# Patient Record
Sex: MALE | Race: OTHER | HISPANIC OR LATINO | Employment: FULL TIME | URBAN - METROPOLITAN AREA
[De-identification: names, ages, dates, MRNs, and addresses within clinical notes are randomized per-mention and may not be internally consistent; named-entity substitution may affect disease eponyms.]

---

## 2019-02-27 ENCOUNTER — APPOINTMENT (OUTPATIENT)
Dept: RADIOLOGY | Age: 29
End: 2019-02-27
Attending: PREVENTIVE MEDICINE
Payer: OTHER MISCELLANEOUS

## 2019-02-27 ENCOUNTER — APPOINTMENT (OUTPATIENT)
Dept: URGENT CARE | Age: 29
End: 2019-02-27
Payer: OTHER MISCELLANEOUS

## 2019-02-27 DIAGNOSIS — S69.90XA INJURY OF WRIST, UNSPECIFIED LATERALITY, INITIAL ENCOUNTER: ICD-10-CM

## 2019-02-27 DIAGNOSIS — S69.90XA INJURY OF WRIST, UNSPECIFIED LATERALITY, INITIAL ENCOUNTER: Primary | ICD-10-CM

## 2019-02-27 PROCEDURE — 99283 EMERGENCY DEPT VISIT LOW MDM: CPT | Performed by: PREVENTIVE MEDICINE

## 2019-02-27 PROCEDURE — G0382 LEV 3 HOSP TYPE B ED VISIT: HCPCS | Performed by: PREVENTIVE MEDICINE

## 2019-02-27 PROCEDURE — 73130 X-RAY EXAM OF HAND: CPT

## 2019-02-27 PROCEDURE — 73110 X-RAY EXAM OF WRIST: CPT

## 2019-03-06 ENCOUNTER — APPOINTMENT (OUTPATIENT)
Dept: URGENT CARE | Age: 29
End: 2019-03-06
Payer: OTHER MISCELLANEOUS

## 2019-03-06 ENCOUNTER — TRANSCRIBE ORDERS (OUTPATIENT)
Dept: ADMINISTRATIVE | Facility: HOSPITAL | Age: 29
End: 2019-03-06

## 2019-03-06 DIAGNOSIS — G56.00 CARPAL TUNNEL SYNDROME, UNSPECIFIED LATERALITY: Primary | ICD-10-CM

## 2019-03-06 DIAGNOSIS — R60.0 LOCALIZED EDEMA: ICD-10-CM

## 2019-03-06 PROCEDURE — 99213 OFFICE O/P EST LOW 20 MIN: CPT | Performed by: PREVENTIVE MEDICINE

## 2019-03-20 ENCOUNTER — APPOINTMENT (OUTPATIENT)
Dept: URGENT CARE | Age: 29
End: 2019-03-20
Payer: OTHER MISCELLANEOUS

## 2019-03-20 PROCEDURE — 99213 OFFICE O/P EST LOW 20 MIN: CPT | Performed by: PREVENTIVE MEDICINE

## 2019-05-17 ENCOUNTER — APPOINTMENT (OUTPATIENT)
Dept: URGENT CARE | Age: 29
End: 2019-05-17

## 2019-07-10 VITALS
WEIGHT: 246.2 LBS | SYSTOLIC BLOOD PRESSURE: 116 MMHG | HEIGHT: 69 IN | DIASTOLIC BLOOD PRESSURE: 71 MMHG | BODY MASS INDEX: 36.46 KG/M2 | HEART RATE: 70 BPM

## 2019-07-10 DIAGNOSIS — G56.01 CARPAL TUNNEL SYNDROME ON RIGHT: Primary | ICD-10-CM

## 2019-07-10 DIAGNOSIS — G56.02 CARPAL TUNNEL SYNDROME OF LEFT WRIST: ICD-10-CM

## 2019-07-10 PROCEDURE — 99203 OFFICE O/P NEW LOW 30 MIN: CPT | Performed by: ORTHOPAEDIC SURGERY

## 2019-07-10 NOTE — H&P
ASSESSMENT/PLAN:    Assessment:   Carpal Tunnel Syndrome  bilateral    Plan:   Endoscopic Carpal Tunnel Release  Left  Endoscopic Carpal Tunnel Release  Right    Follow Up: After Surgery    To Do Next Visit:  Sutures out    Operative Discussions:     Endoscopic Carpal Tunnel Release: The anatomy and physiology of carpal tunnel syndrome was discussed with the patient today  Increase pressure localized under the transverse carpal ligament can cause pain, numbness, tingling, or dysesthesias within the median nerve distribution as well as feelings of fatigue, clumsiness, or awkwardness  These symptoms typically occur at night and worse in the morning upon waking  Eventually, untreated carpal tunnel syndrome can result in weakness and permanent loss of muscle within the thenar compartment of the hand  Treatment options were discussed with the patient  Conservative treatment includes nocturnal resting splints to keep the nerve in a neutral position, ergonomic changes within the work or home environment, activity modification, and tendon gliding exercises  Steroid injections within the carpal canal can help a majority of patients, however this is often self-limited in a majority of patients  Surgical intervention to divide the transverse carpal ligament typically results in a long-lasting relief of the patient's complaints, with the recurrence rate of less than 1%  The patient has elected to undergo an endoscopic carpal tunnel release  The 2 incision technique was discussed with the patient, which results in approximately a two-week less recovery time, and less wound complications  In the postoperative period, light activities are allowed immediately, driving is allowed when narcotic medication has stopped, and the bandages may be removed and incision may get wet after 2 days  Heavy activities (lifting more than approximately 10 pounds) will be allowed after follow up appointment in 1-2 weeks    While the pain and discomfort in the hands generally improves rapidly, the numbness and tingling as well as the strength will slowly improve over weeks to months depending on the severity of the carpal tunnel syndrome  Pillar pain was discussed with the patient, which is typically a common but self-limiting condition  The risks of bleeding and infection from the surgery are less than 1%  Risk of recurrence is approximately 0 5%  The risks of nerve injury or nerve damage or damage to the blood vessels is approximately 1 in 1200  The patient has an understanding of the above mentioned discussion  The risks and benefits of the procedure were explained to the patient, which include, but are not limited to: Bleeding, infection, recurrence, pain, scar, damage to tendons, damage to nerves, and damage to blood vessels, failure to give desired results and complications related to anesthesia   These risks, along with alternative conservative treatment options, and postoperative protocols were voiced back and understood by the patient   All questions were answered to the patient's satisfaction   The patient agrees to comply with a standard postoperative protocol, and is willing to proceed   Education was provided via written and auditory forms   There were no barriers to learning  Written handouts regarding wound care, incision and scar care, and general preoperative information was provided to the patient   Prior to surgery, the patient may be requested to stop all anti-inflammatory medications   Prophylactic aspirin, Plavix, and Coumadin may be allowed to be continued   Medications including vitamin E , ginkgo, and fish oil are requested to be stopped approximately one week prior to surgery   Hypertensive medications and beta blockers, if taken, should be continued        _____________________________________________________  CHIEF COMPLAINT:  Chief Complaint   Patient presents with    Left Wrist - Pain, Numbness, Tingling, Swelling    Right Wrist - Pain, Numbness, Tingling, Swelling         SUBJECTIVE:  Michelle Maria is a 30y o  year old male who presents with Numbness to the bilateral index finger, long finger and thumb  This started  1 year(s) ago as Due to a work injury  Patient states he has been wearing his night time splints as instructed with minimal relief of his symptoms  Symptoms wake him from sleep at night  He has increased pain when gripping things at work  PAST MEDICAL HISTORY:  History reviewed  No pertinent past medical history  PAST SURGICAL HISTORY:  Past Surgical History:   Procedure Laterality Date    APPENDECTOMY         FAMILY HISTORY:  Family History   Problem Relation Age of Onset    No Known Problems Mother     No Known Problems Father        SOCIAL HISTORY:  Social History     Tobacco Use    Smoking status: Never Smoker    Smokeless tobacco: Never Used   Substance Use Topics    Alcohol use: Not on file    Drug use: Not on file       MEDICATIONS:  No current outpatient medications on file  ALLERGIES:  No Known Allergies    REVIEW OF SYSTEMS:  Pertinent items are noted in HPI  A comprehensive review of systems was negative  LABS:  HgA1c: No results found for: HGBA1C  BMP: No results found for: GLUCOSE, CALCIUM, NA, K, CO2, CL, BUN, CREATININE      _____________________________________________________  PHYSICAL EXAMINATION:  /71   Pulse 70   Ht 5' 9" (1 753 m)   Wt 112 kg (246 lb 3 2 oz)   BMI 36 36 kg/m²    General: well developed and well nourished, alert, oriented times 3 and appears comfortable  Psychiatric: Normal  HEENT: Trachea Midline, No torticollis  Cardiovascular: No discernable arrhythmia  Pulmonary: No wheezing or stridor  Skin: No masses, erthema, lacerations, fluctation, ulcerations  Neurovascular: Pulses Intact    MUSCULOSKELETAL EXAMINATION:  Right Carpal Tunnel Exam:  Negataive thenar atrophy  Positive phalen's test  Positive carpal tunnel compression   Positive tinels over median nerve at the wrist   APB 4+5   +TTP over the [facility] joint, no crepitation  Positive scratch collapse    Left Carpal Tunnel Exam:  Negative thenar atrophy  Negative phalen's test  Positive carpal tunnel compression   Positive tinels over median nerve at the wrist   APB 5/5  +TTP over the [facility] joint, no crepitiation  Positive scratch collapse    _____________________________________________________  STUDIES REVIEWED:  EMG: done outside at [facility] shows moderate carpal tunnel syndrome bilaterally       PROCEDURES PERFORMED:  Procedures  No Procedures performed today      Scribe Attestation    I,:   Glenny Pearce am acting as a scribe while in the presence of the attending physician :        I,:   Alicia Pedro MD personally performed the services described in this documentation    as scribed in my presence :          Scribe Attestation    I,:   Glenny Pearce am acting as a scribe while in the presence of the attending physician :        I,:   Alicia Pedro MD personally performed the services described in this documentation    as scribed in my presence :

## 2019-07-10 NOTE — LETTER
July 10, 2019     Patient: Lopez Araujo   YOB: 1990   Date of Visit: 7/10/2019       To Whom it May Concern:    Lopez Aarujo is under my professional care  He was seen in my office on 7/10/2019  He will remain out of work from his surgery date to post op date  Patient will likely be released to light duty for 3 weeks following is surgery with anticipation full duty release 6 weeks following his second surgery  If you have any questions or concerns, please don't hesitate to call           Sincerely,          Corona Tam MD        CC: No Recipients

## 2019-07-10 NOTE — PROGRESS NOTES
ASSESSMENT/PLAN:    Assessment:   Carpal Tunnel Syndrome  bilateral    Plan:   Endoscopic Carpal Tunnel Release  Left  Endoscopic Carpal Tunnel Release  Right    Follow Up: After Surgery    To Do Next Visit:  Sutures out    Operative Discussions:     Endoscopic Carpal Tunnel Release: The anatomy and physiology of carpal tunnel syndrome was discussed with the patient today  Increase pressure localized under the transverse carpal ligament can cause pain, numbness, tingling, or dysesthesias within the median nerve distribution as well as feelings of fatigue, clumsiness, or awkwardness  These symptoms typically occur at night and worse in the morning upon waking  Eventually, untreated carpal tunnel syndrome can result in weakness and permanent loss of muscle within the thenar compartment of the hand  Treatment options were discussed with the patient  Conservative treatment includes nocturnal resting splints to keep the nerve in a neutral position, ergonomic changes within the work or home environment, activity modification, and tendon gliding exercises  Steroid injections within the carpal canal can help a majority of patients, however this is often self-limited in a majority of patients  Surgical intervention to divide the transverse carpal ligament typically results in a long-lasting relief of the patient's complaints, with the recurrence rate of less than 1%  The patient has elected to undergo an endoscopic carpal tunnel release  The 2 incision technique was discussed with the patient, which results in approximately a two-week less recovery time, and less wound complications  In the postoperative period, light activities are allowed immediately, driving is allowed when narcotic medication has stopped, and the bandages may be removed and incision may get wet after 2 days  Heavy activities (lifting more than approximately 10 pounds) will be allowed after follow up appointment in 1-2 weeks    While the pain and discomfort in the hands generally improves rapidly, the numbness and tingling as well as the strength will slowly improve over weeks to months depending on the severity of the carpal tunnel syndrome  Pillar pain was discussed with the patient, which is typically a common but self-limiting condition  The risks of bleeding and infection from the surgery are less than 1%  Risk of recurrence is approximately 0 5%  The risks of nerve injury or nerve damage or damage to the blood vessels is approximately 1 in 1200  The patient has an understanding of the above mentioned discussion  The risks and benefits of the procedure were explained to the patient, which include, but are not limited to: Bleeding, infection, recurrence, pain, scar, damage to tendons, damage to nerves, and damage to blood vessels, failure to give desired results and complications related to anesthesia   These risks, along with alternative conservative treatment options, and postoperative protocols were voiced back and understood by the patient   All questions were answered to the patient's satisfaction   The patient agrees to comply with a standard postoperative protocol, and is willing to proceed   Education was provided via written and auditory forms   There were no barriers to learning  Written handouts regarding wound care, incision and scar care, and general preoperative information was provided to the patient   Prior to surgery, the patient may be requested to stop all anti-inflammatory medications   Prophylactic aspirin, Plavix, and Coumadin may be allowed to be continued   Medications including vitamin E , ginkgo, and fish oil are requested to be stopped approximately one week prior to surgery   Hypertensive medications and beta blockers, if taken, should be continued        _____________________________________________________  CHIEF COMPLAINT:  Chief Complaint   Patient presents with    Left Wrist - Pain, Numbness, Tingling, Swelling    Right Wrist - Pain, Numbness, Tingling, Swelling         SUBJECTIVE:  Tone Khan is a 30y o  year old male who presents with Numbness to the bilateral index finger, long finger and thumb  This started  1 year(s) ago as Due to a work injury  Patient states he has been wearing his night time splints as instructed with minimal relief of his symptoms  Symptoms wake him from sleep at night  He has increased pain when gripping things at work  PAST MEDICAL HISTORY:  History reviewed  No pertinent past medical history  PAST SURGICAL HISTORY:  Past Surgical History:   Procedure Laterality Date    APPENDECTOMY         FAMILY HISTORY:  Family History   Problem Relation Age of Onset    No Known Problems Mother     No Known Problems Father        SOCIAL HISTORY:  Social History     Tobacco Use    Smoking status: Never Smoker    Smokeless tobacco: Never Used   Substance Use Topics    Alcohol use: Not on file    Drug use: Not on file       MEDICATIONS:  No current outpatient medications on file  ALLERGIES:  No Known Allergies    REVIEW OF SYSTEMS:  Pertinent items are noted in HPI  A comprehensive review of systems was negative  LABS:  HgA1c: No results found for: HGBA1C  BMP: No results found for: GLUCOSE, CALCIUM, NA, K, CO2, CL, BUN, CREATININE      _____________________________________________________  PHYSICAL EXAMINATION:  /71   Pulse 70   Ht 5' 9" (1 753 m)   Wt 112 kg (246 lb 3 2 oz)   BMI 36 36 kg/m²   General: well developed and well nourished, alert, oriented times 3 and appears comfortable  Psychiatric: Normal  HEENT: Trachea Midline, No torticollis  Cardiovascular: No discernable arrhythmia  Pulmonary: No wheezing or stridor  Skin: No masses, erthema, lacerations, fluctation, ulcerations  Neurovascular: Pulses Intact    MUSCULOSKELETAL EXAMINATION:  Right Carpal Tunnel Exam:  Negataive thenar atrophy  Positive phalen's test  Positive carpal tunnel compression   Positive tinels over median nerve at the wrist   APB 4+5   +TTP over the [facility] joint, no crepitation  Positive scratch collapse    Left Carpal Tunnel Exam:  Negative thenar atrophy  Negative phalen's test  Positive carpal tunnel compression   Positive tinels over median nerve at the wrist   APB 5/5  +TTP over the [facility] joint, no crepitiation  Positive scratch collapse    _____________________________________________________  STUDIES REVIEWED:  EMG: done outside at [facility]  shows moderate carpal tunnel syndrome bilaterally       PROCEDURES PERFORMED:  Procedures  No Procedures performed today      Scribe Attestation    I,:   Arleen Witt am acting as a scribe while in the presence of the attending physician :        I,:   Job Guillermo MD personally performed the services described in this documentation    as scribed in my presence :          Scribe Attestation    I,:   Arleen Witt am acting as a scribe while in the presence of the attending physician :        I,:   Job Guillermo MD personally performed the services described in this documentation    as scribed in my presence :

## 2019-08-08 ENCOUNTER — OFFICE VISIT (OUTPATIENT)
Dept: FAMILY MEDICINE CLINIC | Facility: CLINIC | Age: 29
End: 2019-08-08
Payer: COMMERCIAL

## 2019-08-08 VITALS
WEIGHT: 241 LBS | TEMPERATURE: 97.5 F | SYSTOLIC BLOOD PRESSURE: 102 MMHG | HEART RATE: 82 BPM | HEIGHT: 69 IN | BODY MASS INDEX: 35.7 KG/M2 | DIASTOLIC BLOOD PRESSURE: 70 MMHG | OXYGEN SATURATION: 97 % | RESPIRATION RATE: 17 BRPM

## 2019-08-08 DIAGNOSIS — Z13.1 ENCOUNTER FOR SCREENING EXAMINATION FOR IMPAIRED GLUCOSE REGULATION AND DIABETES MELLITUS: ICD-10-CM

## 2019-08-08 DIAGNOSIS — N39.43 DRIBBLING FOLLOWING URINATION: ICD-10-CM

## 2019-08-08 DIAGNOSIS — R39.11 URINARY HESITANCY: ICD-10-CM

## 2019-08-08 DIAGNOSIS — E78.6 LOW HDL (UNDER 40): ICD-10-CM

## 2019-08-08 DIAGNOSIS — Z76.89 ENCOUNTER TO ESTABLISH CARE: Primary | ICD-10-CM

## 2019-08-08 PROBLEM — Z87.09 HISTORY OF ASTHMA: Status: ACTIVE | Noted: 2019-08-08

## 2019-08-08 PROCEDURE — 99203 OFFICE O/P NEW LOW 30 MIN: CPT | Performed by: FAMILY MEDICINE

## 2019-08-08 PROCEDURE — 3008F BODY MASS INDEX DOCD: CPT | Performed by: FAMILY MEDICINE

## 2019-08-08 RX ORDER — CHLORAL HYDRATE 500 MG
1000 CAPSULE ORAL DAILY
COMMUNITY
End: 2021-04-16

## 2019-08-08 NOTE — PROGRESS NOTES
Assessment/Plan:         Diagnoses and all orders for this visit:    Encounter to establish care    Low HDL (under 40)  -     Lipid panel; Future  -     Comprehensive metabolic panel; Future    Urinary hesitancy  -     UA w Reflex to Microscopic w Reflex to Culture -Lab Collect  -     Ambulatory referral to Urology; Future    Dribbling following urination  -     UA w Reflex to Microscopic w Reflex to Culture -Lab Collect  -     Ambulatory referral to Urology; Future    Encounter for screening examination for impaired glucose regulation and diabetes mellitus  -     Comprehensive metabolic panel; Future          Subjective:   Chief Complaint   Patient presents with   [facility] Establish Care     getting carpal tunnel sx done in 2 weeks through workmans comp    check up     has not been to a doc in years- would like to have routine BW done    Difficulty Urinating     takes a while to relieve himself         Patient ID: Funmi Fritz is a 30 y o  male  New pt, [facility] has been [facility], old labs show only low HDL as abnormality  He does give C/o couple years urinary hesitancy, getting worse, assoc with dribbling following urination, no pain or other urinary c/o        The following portions of the patient's history were reviewed and updated as appropriate: allergies, current medications, past family history, past medical history, past social history, past surgical history and problem list     Review of Systems   Constitutional: Negative  Eyes: Negative  Respiratory: Negative  Cardiovascular: Negative  Gastrointestinal: Negative  Endocrine: Negative  Genitourinary: Negative for decreased urine volume, discharge, dysuria, enuresis, flank pain, frequency, genital sores, hematuria, penile pain, scrotal swelling, testicular pain and urgency  Per hpi   Skin: Negative  Neurological: Negative  Hematological: Negative            Objective:      /70   Pulse 82   Temp 97 5 °F (36 4 °C)   Resp 17   Ht 5' 9" (1 753 m)   Wt 109 kg (241 lb)   SpO2 97%   BMI 35 59 kg/m²          Physical Exam   Constitutional: He is oriented to person, place, and time  He appears well-developed  He is cooperative  Non-toxic appearance  He does not have a sickly appearance  He does not appear ill  No distress  HENT:   Head: Normocephalic and atraumatic  Mouth/Throat: Uvula is midline, oropharynx is clear and moist and mucous membranes are normal    Eyes: Pupils are equal, round, and reactive to light  Conjunctivae and lids are normal    Neck: Trachea normal  Neck supple  No JVD present  No thyroid mass and no thyromegaly present  Cardiovascular: Normal rate, regular rhythm, normal heart sounds and normal pulses  Pulmonary/Chest: Effort normal and breath sounds normal    Abdominal: Soft  Bowel sounds are normal  He exhibits no distension, no abdominal bruit and no mass  There is no hepatosplenomegaly  There is no tenderness  Genitourinary:   Genitourinary Comments: Deferred     Lymphadenopathy:     He has no cervical adenopathy  Right: No supraclavicular adenopathy present  Left: No supraclavicular adenopathy present  Neurological: He is alert and oriented to person, place, and time  He has normal strength and normal reflexes  Gait normal    Skin: Skin is warm and dry  He is not diaphoretic  No cyanosis  No pallor  Psychiatric: He has a normal mood and affect  His behavior is normal    Nursing note and vitals reviewed

## 2019-08-19 ENCOUNTER — ANESTHESIA EVENT (OUTPATIENT)
Dept: PERIOP | Facility: HOSPITAL | Age: 29
End: 2019-08-19
Payer: OTHER MISCELLANEOUS

## 2019-08-20 ENCOUNTER — ANESTHESIA (OUTPATIENT)
Dept: PERIOP | Facility: HOSPITAL | Age: 29
End: 2019-08-20
Payer: OTHER MISCELLANEOUS

## 2019-08-20 ENCOUNTER — HOSPITAL ENCOUNTER (OUTPATIENT)
Facility: HOSPITAL | Age: 29
Setting detail: OUTPATIENT SURGERY
Discharge: HOME | End: 2019-08-20
Attending: ORTHOPAEDIC SURGERY | Admitting: ORTHOPAEDIC SURGERY
Payer: OTHER MISCELLANEOUS

## 2019-08-20 VITALS
BODY MASS INDEX: 35.55 KG/M2 | OXYGEN SATURATION: 97 % | TEMPERATURE: 97.9 F | HEIGHT: 69 IN | RESPIRATION RATE: 20 BRPM | DIASTOLIC BLOOD PRESSURE: 65 MMHG | SYSTOLIC BLOOD PRESSURE: 117 MMHG | HEART RATE: 69 BPM | WEIGHT: 240 LBS

## 2019-08-20 DIAGNOSIS — G56.01 CARPAL TUNNEL SYNDROME ON RIGHT: Primary | ICD-10-CM

## 2019-08-20 DIAGNOSIS — G56.02 CARPAL TUNNEL SYNDROME OF LEFT WRIST: ICD-10-CM

## 2019-08-20 PROCEDURE — 29848 WRIST ENDOSCOPY/SURGERY: CPT | Performed by: ORTHOPAEDIC SURGERY

## 2019-08-20 PROCEDURE — 99024 POSTOP FOLLOW-UP VISIT: CPT | Performed by: ORTHOPAEDIC SURGERY

## 2019-08-20 PROCEDURE — 29848 WRIST ENDOSCOPY/SURGERY: CPT | Performed by: PHYSICIAN ASSISTANT

## 2019-08-20 RX ORDER — HYDROCODONE BITARTRATE AND ACETAMINOPHEN 5; 325 MG/1; MG/1
1 TABLET ORAL EVERY 6 HOURS PRN
Qty: 5 TABLET | Refills: 0 | Status: ON HOLD | OUTPATIENT
Start: 2019-08-20 | End: 2019-10-08 | Stop reason: SDUPTHER

## 2019-08-20 RX ORDER — DEXAMETHASONE SODIUM PHOSPHATE 4 MG/ML
10 INJECTION, SOLUTION INTRA-ARTICULAR; INTRALESIONAL; INTRAMUSCULAR; INTRAVENOUS; SOFT TISSUE ONCE AS NEEDED
Status: DISCONTINUED | OUTPATIENT
Start: 2019-08-20 | End: 2019-08-20 | Stop reason: HOSPADM

## 2019-08-20 RX ORDER — LIDOCAINE HYDROCHLORIDE 10 MG/ML
0.5 INJECTION, SOLUTION EPIDURAL; INFILTRATION; INTRACAUDAL; PERINEURAL ONCE AS NEEDED
Status: DISCONTINUED | OUTPATIENT
Start: 2019-08-20 | End: 2019-08-20 | Stop reason: HOSPADM

## 2019-08-20 RX ORDER — SODIUM CHLORIDE, SODIUM LACTATE, POTASSIUM CHLORIDE, CALCIUM CHLORIDE 600; 310; 30; 20 MG/100ML; MG/100ML; MG/100ML; MG/100ML
125 INJECTION, SOLUTION INTRAVENOUS CONTINUOUS
Status: DISCONTINUED | OUTPATIENT
Start: 2019-08-20 | End: 2019-08-20 | Stop reason: HOSPADM

## 2019-08-20 RX ORDER — LIDOCAINE HYDROCHLORIDE AND EPINEPHRINE 10; 10 MG/ML; UG/ML
INJECTION, SOLUTION INFILTRATION; PERINEURAL AS NEEDED
Status: DISCONTINUED | OUTPATIENT
Start: 2019-08-20 | End: 2019-08-20 | Stop reason: HOSPADM

## 2019-08-20 RX ORDER — KETAMINE HYDROCHLORIDE 50 MG/ML
INJECTION, SOLUTION, CONCENTRATE INTRAMUSCULAR; INTRAVENOUS AS NEEDED
Status: DISCONTINUED | OUTPATIENT
Start: 2019-08-20 | End: 2019-08-20 | Stop reason: SURG

## 2019-08-20 RX ORDER — ONDANSETRON 2 MG/ML
INJECTION INTRAMUSCULAR; INTRAVENOUS AS NEEDED
Status: DISCONTINUED | OUTPATIENT
Start: 2019-08-20 | End: 2019-08-20 | Stop reason: SURG

## 2019-08-20 RX ORDER — NAPROXEN SODIUM 220 MG
220 TABLET ORAL 2 TIMES DAILY WITH MEALS
Qty: 10 TABLET | Refills: 0 | Status: ON HOLD | OUTPATIENT
Start: 2019-08-20 | End: 2019-10-08

## 2019-08-20 RX ORDER — SENNOSIDES 8.6 MG
650 CAPSULE ORAL EVERY 8 HOURS
Qty: 15 TABLET | Refills: 0 | Status: SHIPPED | OUTPATIENT
Start: 2019-08-20 | End: 2019-08-25

## 2019-08-20 RX ORDER — MIDAZOLAM HYDROCHLORIDE 1 MG/ML
INJECTION INTRAMUSCULAR; INTRAVENOUS AS NEEDED
Status: DISCONTINUED | OUTPATIENT
Start: 2019-08-20 | End: 2019-08-20 | Stop reason: SURG

## 2019-08-20 RX ORDER — GLYCOPYRROLATE 0.2 MG/ML
INJECTION INTRAMUSCULAR; INTRAVENOUS AS NEEDED
Status: DISCONTINUED | OUTPATIENT
Start: 2019-08-20 | End: 2019-08-20 | Stop reason: SURG

## 2019-08-20 RX ORDER — PROPOFOL 10 MG/ML
INJECTION, EMULSION INTRAVENOUS CONTINUOUS PRN
Status: DISCONTINUED | OUTPATIENT
Start: 2019-08-20 | End: 2019-08-20 | Stop reason: SURG

## 2019-08-20 RX ORDER — MULTIVITAMIN
1 CAPSULE ORAL DAILY
COMMUNITY
End: 2021-04-16

## 2019-08-20 RX ORDER — HYDROCODONE BITARTRATE AND ACETAMINOPHEN 5; 325 MG/1; MG/1
1 TABLET ORAL ONCE AS NEEDED
Status: DISCONTINUED | OUTPATIENT
Start: 2019-08-20 | End: 2019-08-20 | Stop reason: HOSPADM

## 2019-08-20 RX ORDER — PROPOFOL 10 MG/ML
INJECTION, EMULSION INTRAVENOUS AS NEEDED
Status: DISCONTINUED | OUTPATIENT
Start: 2019-08-20 | End: 2019-08-20 | Stop reason: SURG

## 2019-08-20 RX ADMIN — SODIUM CHLORIDE, SODIUM LACTATE, POTASSIUM CHLORIDE, AND CALCIUM CHLORIDE: .6; .31; .03; .02 INJECTION, SOLUTION INTRAVENOUS at 09:42

## 2019-08-20 RX ADMIN — PROPOFOL 50 MG: 10 INJECTION, EMULSION INTRAVENOUS at 10:03

## 2019-08-20 RX ADMIN — PROPOFOL 20 MCG/KG/MIN: 10 INJECTION, EMULSION INTRAVENOUS at 09:51

## 2019-08-20 RX ADMIN — PROPOFOL 50 MG: 10 INJECTION, EMULSION INTRAVENOUS at 09:55

## 2019-08-20 RX ADMIN — GLYCOPYRROLATE 0.2 MG: 0.2 INJECTION, SOLUTION INTRAMUSCULAR; INTRAVENOUS at 09:55

## 2019-08-20 RX ADMIN — PROPOFOL 50 MG: 10 INJECTION, EMULSION INTRAVENOUS at 10:05

## 2019-08-20 RX ADMIN — PROPOFOL 50 MG: 10 INJECTION, EMULSION INTRAVENOUS at 09:58

## 2019-08-20 RX ADMIN — PROPOFOL 50 MG: 10 INJECTION, EMULSION INTRAVENOUS at 10:01

## 2019-08-20 RX ADMIN — ONDANSETRON 4 MG: 2 INJECTION INTRAMUSCULAR; INTRAVENOUS at 09:55

## 2019-08-20 RX ADMIN — KETAMINE HYDROCHLORIDE 30 MG: 50 INJECTION, SOLUTION INTRAMUSCULAR; INTRAVENOUS at 09:55

## 2019-08-20 RX ADMIN — MIDAZOLAM 2 MG: 1 INJECTION INTRAMUSCULAR; INTRAVENOUS at 09:48

## 2019-08-20 NOTE — ANESTHESIA PREPROCEDURE EVALUATION
Review of Systems/Medical History          Cardiovascular   Pulmonary       GI/Hepatic            Endo/Other    Obesity    GYN       Hematology   Musculoskeletal       Neurology   Psychology           Physical Exam    Airway    Mallampati score: II         Dental       Cardiovascular      Pulmonary      Other Findings        Anesthesia Plan  ASA Score- 2     Anesthesia Type- IV sedation with anesthesia with ASA Monitors  Additional Monitors:   Airway Plan:         Plan Factors-    Induction- intravenous  Postoperative Plan-     Informed Consent- Anesthetic plan and risks discussed with patient  I personally reviewed this patient with the CRNA  Discussed and agreed on the Anesthesia Plan with the CRNA  Levi Ramsey

## 2019-08-20 NOTE — OP NOTE
OPERATIVE REPORT  PATIENT NAME: Karthik Keen  :  1990  MRN: [id number]  Pt Location: [facility]    SURGERY DATE: 19    Surgeon(s) and Role:     * Cinthia Triplett MD - Primary     * Stef Donovan PA-C - Assisting    Pre-Op Diagnosis:  Carpal tunnel syndrome on right [G56 01]    Post-Op Diagnosis Codes:     * Carpal tunnel syndrome on right [G56 01]    Procedure(s):  RELEASE CARPAL TUNNEL ENDOSCOPIC (Right)    Specimen(s):  * No orders in the log *    Estimated Blood Loss:   Minimal      Anesthesia Type:   IV Sedation with Anesthesia    Operative Indications: The patient has a history of Carpal Tunnel Syndrome  right that was recalcitrant to conservative management  The decision was made to bring the patient to the operating room for Endoscopic Carpal Tunnel Release  right  Risks of the procedure were explained which include, but are not limited to bleeding; infection; damage to nerves, arteries,veins, tendons; scar; pain; need for reoperation; failure to give desired result; and risks of anaesthesia  All questions were answered to satisfaction and they were willing to proceed  Operative Findings:  Right carpal tunnel syndrome    Complications:   None    Procedure and Technique:  After the patient, site, and procedure were identified, the patient was brought into the operating room in a supine position  Local anaesthesia and sedation were provided  A tourniquet was not used  The  right upper extremity was then prepped and drapped in a normal, sterile, orthopedic fashion  After reconfirmation of the patient, site, and surgical procedure, which was agreed upon by the entire surgical team, attention was turned to the right wrist   The sites of the proximal and distal incisions were marked    The dev of the proximal incision was placed horizontally at the midline of the wrist   The distal incision dev was longitudinal extending distally from the point of intersection of the line between the long finger and ring finger and the line along the distal border of the fully abducted thumb  The proximal incision was performed  Subcutaneous tissues were dissected  Then the transverse volar antebrachial fascia was perforated with a scalpel  The edges of the skin incision where retracted and the forearm fascia was incised for approximately 1 5 cm proximally with care taken to identify and protect the median nerve  Retractors were used to inspect the transverse carpal ligament distally  A curved Cevallos dissector was used to glide under the transverse carpal ligament and superficial to the median nerve with confirmation via the washboard feeling  Then the curved Cevallos was pushed into the palm toward the distal incision site  When the location of the distal skin dev was adequate, the distal incision was made  Then with retraction of the skin, further dissection and perforation of the palmar fascia was performed with the use of tenotomy scissors  The curved Cevallos was guided from proximal to distal out the distal incisions without any twisting to allow for dilation of the tract  The curved Cevallos was removed, and the cannula for the camera was inserted along the same tract, making sure to keep the alignment post on the cannula perpendicular to the plane of the hand without twisting  Then while keeping the wrist in extension, and holding the cannula of the camera in place, the wrist was placed on the hyperextension board  The scope was inserted distally, and a cotton-tip applicator was used proximally to clean the tract as well as the scope  A curved cutting knife was introduced from proximal to distal while keeping visualization with the use of the camera  Without twisting of the canula, the knife was used to cut the transverse carpal ligament completely, making sure there were no remnant fibers  Then after this was accomplished, the hand was removed from the extension block    Three maneuvers were used to confirm the full release of the transverse carpal ligament  First, the ease of twisting the trocar of the camera confirmed the release of the ligament  Second, the curved Cevallos was introduced to make sure there were no remnant fibers that could be felt palmarly  Third, the scope was introduced again to visualize that the whole ligament was released proximally to distally  Additional confirmation of full release included retraction and inspection in the distal and proximal incisions to make sure there were no remnant fibers distally or proximally respectively  At the completion of the procedure, hemostasis was obtained with cautery and direct pressure  The wounds were copiously irrigated with sterile solution  The wounds were closed with Prolene  Sterile dressings were applied, including Xeroform, gauze, tweeners, webril, ACE  Please note, all sponge, needle, and instrument counts were correct prior to closure  Loupe magnification was utilized  The patient tolerated the procedure well       I was present for the entire procedure, A qualified resident physician was not available and A physician assistant was required during the procedure for retraction tissue handling,dissection and suturing    Patient Disposition:  PACU  and hemodynamically stable    SIGNATURE: Glendale Apley, MD  DATE: 08/20/19  TIME: 10:28 AM

## 2019-08-20 NOTE — H&P
H&P Exam - Orthopedics   Lopez Araujo 30 y o  male MRN: [id number]  Unit/Bed#: OR POOL    Assessment/Plan   Assessment:  B/L CTS  Plan:  R ECTR today followed by L ECTR at a later date    History of Present Illness   HPI:  Lopez Araujo is a 30 y o  y o  male who presents with continued complaints of b/l index, long and thumb n/t  Nighttime splints have provided minimal relief  Describes nocturnal symptoms  Historical Information  Review Of Systems:   · Skin: Normal  · Neuro: See HPI  · Musculoskeletal: See HPI  · 14 point review of systems negative except as stated above     Past Medical History:   History reviewed  No pertinent past medical history      Past Surgical History:   Past Surgical History:   Procedure Laterality Date    APPENDECTOMY         Family History:  Family history reviewed and non-contributory  Family History   Problem Relation Age of Onset    Diabetes Mother     Diabetes Father     No Known Problems Brother     AVM Brother        Social History:  Social History     Socioeconomic History    Marital status: Single     Spouse name: None    Number of children: None    Years of education: None    Highest education level: None   Occupational History    None   Social Needs    Financial resource strain: None    Food insecurity:     Worry: None     Inability: None    Transportation needs:     Medical: None     Non-medical: None   Tobacco Use    Smoking status: Never Smoker    Smokeless tobacco: Never Used   Substance and Sexual Activity    Alcohol use: Yes     Frequency: Monthly or less     Drinks per session: 1 or 2     Binge frequency: Never    Drug use: Not Currently    Sexual activity: None   Lifestyle    Physical activity:     Days per week: None     Minutes per session: None    Stress: None   Relationships    Social connections:     Talks on phone: None     Gets together: None     Attends Religion service: None     Active member of club or organization: None     Attends meetings of clubs or organizations: None     Relationship status: None    Intimate partner violence:     Fear of current or ex partner: None     Emotionally abused: None     Physically abused: None     Forced sexual activity: None   Other Topics Concern    None   Social History Narrative    Works as  at [employer]    Never , has 10 yo son       Allergies:   No Known Allergies        Labs:  No results found for: HCT, HGB, PT, INR, WBC, ESR, CRP    Meds:  No current facility-administered medications for this encounter  Blood Culture:   No results found for: BLOODCX    Wound Culture:   No results found for: WOUNDCULT    Ins and Outs:  No intake/output data recorded  Physical Exam  There were no vitals taken for this visit  There were no vitals taken for this visit  Gen: Alert and oriented to person, place, time  HEENT: EOMI, eyes clear, moist mucus membranes, hearing intact  Respiratory: Bilateral chest rise   No audible wheezing found  Cardiovascular: Regular Rate and Rhythm  Abdomen: soft nontender/nondistended  Ortho Exam: Well=-maintained ROM  Neuro Exam: Negative thenar atrophy b/l, + Tinel's b/l carpal tunnels, + carpal compression test b/l, +APB weakness on the R    Lab Results: Reviewed  Imaging: Reviewed

## 2019-08-28 LAB
ALBUMIN SERPL-MCNC: 4.5 G/DL (ref 3.6–5.1)
ALBUMIN/GLOB SERPL: 1.5 (CALC) (ref 1–2.5)
ALP SERPL-CCNC: 90 U/L (ref 40–115)
ALT SERPL-CCNC: 34 U/L (ref 9–46)
APPEARANCE UR: CLEAR
AST SERPL-CCNC: 20 U/L (ref 10–40)
BACTERIA UR CULT: NORMAL
BACTERIA UR QL AUTO: NORMAL /HPF
BILIRUB SERPL-MCNC: 0.7 MG/DL (ref 0.2–1.2)
BILIRUB UR QL STRIP: NEGATIVE
BUN SERPL-MCNC: 14 MG/DL (ref 7–25)
BUN/CREAT SERPL: NORMAL (CALC) (ref 6–22)
CALCIUM SERPL-MCNC: 9.8 MG/DL (ref 8.6–10.3)
CHLORIDE SERPL-SCNC: 101 MMOL/L (ref 98–110)
CHOLEST SERPL-MCNC: 176 MG/DL
CHOLEST/HDLC SERPL: 5.3 (CALC)
CO2 SERPL-SCNC: 28 MMOL/L (ref 20–32)
COLOR UR: YELLOW
CREAT SERPL-MCNC: 1.08 MG/DL (ref 0.6–1.35)
GLOBULIN SER CALC-MCNC: 3.1 G/DL (CALC) (ref 1.9–3.7)
GLUCOSE SERPL-MCNC: 89 MG/DL (ref 65–99)
GLUCOSE UR QL STRIP: NEGATIVE
HDLC SERPL-MCNC: 33 MG/DL
HGB UR QL STRIP: NEGATIVE
HYALINE CASTS #/AREA URNS LPF: NORMAL /LPF
KETONES UR QL STRIP: NEGATIVE
LDLC SERPL CALC-MCNC: 114 MG/DL (CALC)
LEUKOCYTE ESTERASE UR QL STRIP: NEGATIVE
NITRITE UR QL STRIP: NEGATIVE
NONHDLC SERPL-MCNC: 143 MG/DL (CALC)
PH UR STRIP: 8 [PH] (ref 5–8)
POTASSIUM SERPL-SCNC: 4.2 MMOL/L (ref 3.5–5.3)
PROT SERPL-MCNC: 7.6 G/DL (ref 6.1–8.1)
PROT UR QL STRIP: NEGATIVE
RBC #/AREA URNS HPF: NORMAL /HPF
SL AMB EGFR AFRICAN AMERICAN: 105 ML/MIN/1.73M2
SL AMB EGFR NON AFRICAN AMERICAN: 91 ML/MIN/1.73M2
SODIUM SERPL-SCNC: 139 MMOL/L (ref 135–146)
SP GR UR STRIP: 1.02 (ref 1–1.03)
SQUAMOUS #/AREA URNS HPF: NORMAL /HPF
TRIGL SERPL-MCNC: 169 MG/DL
WBC #/AREA URNS HPF: NORMAL /HPF

## 2019-08-30 ENCOUNTER — OFFICE VISIT (OUTPATIENT)
Dept: OBGYN CLINIC | Facility: HOSPITAL | Age: 29
End: 2019-08-30

## 2019-08-30 VITALS
BODY MASS INDEX: 35.55 KG/M2 | HEART RATE: 77 BPM | WEIGHT: 240 LBS | HEIGHT: 69 IN | SYSTOLIC BLOOD PRESSURE: 129 MMHG | DIASTOLIC BLOOD PRESSURE: 73 MMHG

## 2019-08-30 DIAGNOSIS — Z98.890 S/P CARPAL TUNNEL RELEASE: Primary | ICD-10-CM

## 2019-08-30 PROCEDURE — 99024 POSTOP FOLLOW-UP VISIT: CPT | Performed by: PHYSICIAN ASSISTANT

## 2019-08-30 NOTE — PROGRESS NOTES
Assessment:   S/P R ECTR 08/20/19    Plan:   Resume activities as tolerated and scar tissue massage    Follow Up:  PRN (he is scheduled for his other side 10/8)        CHIEF COMPLAINT:  Chief Complaint   Patient presents with    Right Hand - Post-op         SUBJECTIVE:  Pete Royal is a 30 y o  male who presents for follow up S/P R ECTR 08/20/19  Patient states his n/t has resolved  He will only have occasional shock-like pain in his thumb when he moves it        PHYSICAL EXAMINATION:  Vital signs: /73   Pulse 77   Ht 5' 9" (1 753 m)   Wt 109 kg (240 lb)   BMI 35 44 kg/m²   General: well developed and well nourished, alert, oriented times 3 and appears comfortable  Psychiatric: Normal    MUSCULOSKELETAL EXAMINATION:  Incision: Clean, dry, intact  Range of Motion: As expected  Neurovascular status: Neuro intact, good cap refill  Activity Restrictions: No restrictions  Done today: Sutures out and Steri strips applied      STUDIES REVIEWED:  No Studies to review      PROCEDURES PERFORMED:  Procedures  No Procedures performed today

## 2019-08-30 NOTE — LETTER
August 30, 2019     Patient: Misty Shah   YOB: 1990   Date of Visit: 8/30/2019       To Whom it May Concern:    Misty Shah is under my professional care  He was seen in my office on 8/30/2019  He may return to work on 10/01/2019 without restrictions  If you have any questions or concerns, please don't hesitate to call           Sincerely,          Kiki Cantu PA-C        CC: No Recipients

## 2019-09-03 ENCOUNTER — TELEPHONE (OUTPATIENT)
Dept: OBGYN CLINIC | Facility: CLINIC | Age: 29
End: 2019-09-03

## 2019-09-30 ENCOUNTER — PREP FOR PROCEDURE (OUTPATIENT)
Dept: OBGYN CLINIC | Facility: HOSPITAL | Age: 29
End: 2019-09-30

## 2019-10-02 DIAGNOSIS — Z91.89 RISK FACTORS FOR OBSTRUCTIVE SLEEP APNEA: Primary | ICD-10-CM

## 2019-10-02 NOTE — PRE-PROCEDURE INSTRUCTIONS
Pre-Surgery Instructions:   Medication Instructions    HYDROcodone-acetaminophen (NORCO) 5-325 mg per tablet Instructed patient per Anesthesia Guidelines   Multiple Vitamin (MULTIVITAMIN) capsule Instructed patient per Anesthesia Guidelines   naproxen sodium (ALEVE) 220 MG tablet Instructed patient per Anesthesia Guidelines   Omega-3 Fatty Acids (FISH OIL) 1,000 mg Instructed patient per Anesthesia Guidelines  Education Index     Med Instructions Troubleshoot   Acetaminophen Med Class     Continue to take this medication on your normal schedule  If this is an oral medication and you take it in the morning, then you may take this medicine with a sip of water  NSAID Med Class     Stop taking this medication at least 3 days prior to surgery/procedure  Opioid Med Class     Continue to take this medication on your normal schedule  If this is an oral medication and you take it in the morning, then you may take this medicine with a sip of water    Pre procedure instructions reviewed verbalizes understanding

## 2019-10-08 ENCOUNTER — ANESTHESIA (OUTPATIENT)
Dept: PERIOP | Facility: HOSPITAL | Age: 29
End: 2019-10-08
Payer: OTHER MISCELLANEOUS

## 2019-10-08 ENCOUNTER — ANESTHESIA EVENT (OUTPATIENT)
Dept: PERIOP | Facility: HOSPITAL | Age: 29
End: 2019-10-08
Payer: OTHER MISCELLANEOUS

## 2019-10-08 ENCOUNTER — HOSPITAL ENCOUNTER (OUTPATIENT)
Facility: HOSPITAL | Age: 29
Setting detail: OUTPATIENT SURGERY
Discharge: HOME | End: 2019-10-08
Attending: ORTHOPAEDIC SURGERY | Admitting: ORTHOPAEDIC SURGERY
Payer: OTHER MISCELLANEOUS

## 2019-10-08 VITALS
BODY MASS INDEX: 35.55 KG/M2 | TEMPERATURE: 96.5 F | DIASTOLIC BLOOD PRESSURE: 71 MMHG | OXYGEN SATURATION: 98 % | HEART RATE: 54 BPM | WEIGHT: 240 LBS | RESPIRATION RATE: 16 BRPM | HEIGHT: 69 IN | SYSTOLIC BLOOD PRESSURE: 119 MMHG

## 2019-10-08 DIAGNOSIS — G56.01 CARPAL TUNNEL SYNDROME ON RIGHT: ICD-10-CM

## 2019-10-08 DIAGNOSIS — G56.02 CARPAL TUNNEL SYNDROME OF LEFT WRIST: ICD-10-CM

## 2019-10-08 PROCEDURE — 99024 POSTOP FOLLOW-UP VISIT: CPT | Performed by: PHYSICIAN ASSISTANT

## 2019-10-08 PROCEDURE — 29848 WRIST ENDOSCOPY/SURGERY: CPT | Performed by: PHYSICIAN ASSISTANT

## 2019-10-08 PROCEDURE — 29848 WRIST ENDOSCOPY/SURGERY: CPT | Performed by: ORTHOPAEDIC SURGERY

## 2019-10-08 RX ORDER — FENTANYL CITRATE 50 UG/ML
INJECTION, SOLUTION INTRAMUSCULAR; INTRAVENOUS AS NEEDED
Status: DISCONTINUED | OUTPATIENT
Start: 2019-10-08 | End: 2019-10-08 | Stop reason: SURG

## 2019-10-08 RX ORDER — MIDAZOLAM HYDROCHLORIDE 1 MG/ML
INJECTION INTRAMUSCULAR; INTRAVENOUS AS NEEDED
Status: DISCONTINUED | OUTPATIENT
Start: 2019-10-08 | End: 2019-10-08 | Stop reason: SURG

## 2019-10-08 RX ORDER — ONDANSETRON 2 MG/ML
4 INJECTION INTRAMUSCULAR; INTRAVENOUS ONCE AS NEEDED
Status: DISCONTINUED | OUTPATIENT
Start: 2019-10-08 | End: 2019-10-08 | Stop reason: HOSPADM

## 2019-10-08 RX ORDER — SODIUM CHLORIDE, SODIUM LACTATE, POTASSIUM CHLORIDE, CALCIUM CHLORIDE 600; 310; 30; 20 MG/100ML; MG/100ML; MG/100ML; MG/100ML
INJECTION, SOLUTION INTRAVENOUS CONTINUOUS PRN
Status: DISCONTINUED | OUTPATIENT
Start: 2019-10-08 | End: 2019-10-08 | Stop reason: SURG

## 2019-10-08 RX ORDER — ONDANSETRON 2 MG/ML
INJECTION INTRAMUSCULAR; INTRAVENOUS AS NEEDED
Status: DISCONTINUED | OUTPATIENT
Start: 2019-10-08 | End: 2019-10-08 | Stop reason: SURG

## 2019-10-08 RX ORDER — FENTANYL CITRATE/PF 50 MCG/ML
25 SYRINGE (ML) INJECTION
Status: DISCONTINUED | OUTPATIENT
Start: 2019-10-08 | End: 2019-10-08 | Stop reason: HOSPADM

## 2019-10-08 RX ORDER — LIDOCAINE HYDROCHLORIDE AND EPINEPHRINE 10; 10 MG/ML; UG/ML
INJECTION, SOLUTION INFILTRATION; PERINEURAL AS NEEDED
Status: DISCONTINUED | OUTPATIENT
Start: 2019-10-08 | End: 2019-10-08 | Stop reason: HOSPADM

## 2019-10-08 RX ORDER — GLYCOPYRROLATE 0.2 MG/ML
INJECTION INTRAMUSCULAR; INTRAVENOUS AS NEEDED
Status: DISCONTINUED | OUTPATIENT
Start: 2019-10-08 | End: 2019-10-08 | Stop reason: SURG

## 2019-10-08 RX ORDER — SENNOSIDES 8.6 MG
650 CAPSULE ORAL EVERY 8 HOURS
Qty: 15 TABLET | Refills: 0 | Status: SHIPPED | OUTPATIENT
Start: 2019-10-08 | End: 2019-10-13

## 2019-10-08 RX ORDER — SODIUM CHLORIDE, SODIUM LACTATE, POTASSIUM CHLORIDE, CALCIUM CHLORIDE 600; 310; 30; 20 MG/100ML; MG/100ML; MG/100ML; MG/100ML
125 INJECTION, SOLUTION INTRAVENOUS CONTINUOUS
Status: DISCONTINUED | OUTPATIENT
Start: 2019-10-08 | End: 2019-10-08 | Stop reason: HOSPADM

## 2019-10-08 RX ORDER — NAPROXEN SODIUM 220 MG
220 TABLET ORAL 2 TIMES DAILY WITH MEALS
Qty: 10 TABLET | Refills: 0 | Status: SHIPPED | OUTPATIENT
Start: 2019-10-08 | End: 2021-04-16

## 2019-10-08 RX ORDER — PROPOFOL 10 MG/ML
INJECTION, EMULSION INTRAVENOUS CONTINUOUS PRN
Status: DISCONTINUED | OUTPATIENT
Start: 2019-10-08 | End: 2019-10-08 | Stop reason: SURG

## 2019-10-08 RX ORDER — HYDROCODONE BITARTRATE AND ACETAMINOPHEN 5; 325 MG/1; MG/1
1 TABLET ORAL EVERY 6 HOURS PRN
Qty: 5 TABLET | Refills: 0 | Status: SHIPPED | OUTPATIENT
Start: 2019-10-08 | End: 2021-04-16

## 2019-10-08 RX ORDER — MAGNESIUM HYDROXIDE 1200 MG/15ML
LIQUID ORAL AS NEEDED
Status: DISCONTINUED | OUTPATIENT
Start: 2019-10-08 | End: 2019-10-08 | Stop reason: HOSPADM

## 2019-10-08 RX ORDER — HYDROCODONE BITARTRATE AND ACETAMINOPHEN 5; 325 MG/1; MG/1
1 TABLET ORAL ONCE AS NEEDED
Status: DISCONTINUED | OUTPATIENT
Start: 2019-10-08 | End: 2019-10-08 | Stop reason: HOSPADM

## 2019-10-08 RX ADMIN — ONDANSETRON 4 MG: 2 INJECTION INTRAMUSCULAR; INTRAVENOUS at 10:40

## 2019-10-08 RX ADMIN — FENTANYL CITRATE 25 MCG: 50 INJECTION, SOLUTION INTRAMUSCULAR; INTRAVENOUS at 10:42

## 2019-10-08 RX ADMIN — FENTANYL CITRATE 25 MCG: 50 INJECTION, SOLUTION INTRAMUSCULAR; INTRAVENOUS at 10:39

## 2019-10-08 RX ADMIN — SODIUM CHLORIDE, SODIUM LACTATE, POTASSIUM CHLORIDE, AND CALCIUM CHLORIDE: .6; .31; .03; .02 INJECTION, SOLUTION INTRAVENOUS at 09:45

## 2019-10-08 RX ADMIN — PROPOFOL 125 MCG/KG/MIN: 10 INJECTION, EMULSION INTRAVENOUS at 10:36

## 2019-10-08 RX ADMIN — MIDAZOLAM 2 MG: 1 INJECTION INTRAMUSCULAR; INTRAVENOUS at 10:33

## 2019-10-08 RX ADMIN — GLYCOPYRROLATE 0.2 MG: 0.2 INJECTION, SOLUTION INTRAMUSCULAR; INTRAVENOUS at 10:33

## 2019-10-08 RX ADMIN — FENTANYL CITRATE 50 MCG: 50 INJECTION, SOLUTION INTRAMUSCULAR; INTRAVENOUS at 10:36

## 2019-10-08 NOTE — OP NOTE
OPERATIVE REPORT  PATIENT NAME: Pablo Marin  :  1990  MRN: [id number]  Pt Location: [facility]    SURGERY DATE: 10/08/19    Surgeon(s) and Role:     * Krystin Rothman MD - Primary     * Kathleen Herrera PA-C - Assisting    Pre-Op Diagnosis:  Carpal tunnel syndrome of left wrist [G56 02]    Post-Op Diagnosis Codes:     * Carpal tunnel syndrome of left wrist [G56 02]    Procedure(s):  RELEASE CARPAL TUNNEL ENDOSCOPIC (Left)    Specimen(s):  * No orders in the log *    Estimated Blood Loss:   Minimal      Anesthesia Type:   IV Sedation with Anesthesia    Operative Indications: The patient has a history of Carpal Tunnel Syndrome  left that was recalcitrant to conservative management  The decision was made to bring the patient to the operating room for Endoscopic Carpal Tunnel Release  left  Risks of the procedure were explained which include, but are not limited to bleeding; infection; damage to nerves, arteries,veins, tendons; scar; pain; need for reoperation; failure to give desired result; and risks of anaesthesia  All questions were answered to satisfaction and they were willing to proceed  Operative Findings:  Left carpal tunnel    Complications:   None    Procedure and Technique:  After the patient, site, and procedure were identified, the patient was brought into the operating room in a supine position  Local anaesthesia and sedation were provided  A tourniquet was not used  The  left upper extremity was then prepped and drapped in a normal, sterile, orthopedic fashion  After reconfirmation of the patient, site, and surgical procedure, which was agreed upon by the entire surgical team, attention was turned to the left wrist   The sites of the proximal and distal incisions were marked    The dev of the proximal incision was placed horizontally at the midline of the wrist   The distal incision dev was longitudinal extending distally from the point of intersection of the line between the long finger and ring finger and the line along the distal border of the fully abducted thumb  The proximal incision was performed  Subcutaneous tissues were dissected  Then the transverse volar antebrachial fascia was perforated with a scalpel  The edges of the skin incision where retracted and the forearm fascia was incised for approximately 1 5 cm proximally with care taken to identify and protect the median nerve  Retractors were used to inspect the transverse carpal ligament distally  A curved Cevallos dissector was used to glide under the transverse carpal ligament and superficial to the median nerve with confirmation via the washboard feeling  Then the curved Cevallos was pushed into the palm toward the distal incision site  When the location of the distal skin dev was adequate, the distal incision was made  Then with retraction of the skin, further dissection and perforation of the palmar fascia was performed with the use of tenotomy scissors  The curved Cevallos was guided from proximal to distal out the distal incisions without any twisting to allow for dilation of the tract  The curved Cevallos was removed, and the cannula for the camera was inserted along the same tract, making sure to keep the alignment post on the cannula perpendicular to the plane of the hand without twisting  Then while keeping the wrist in extension, and holding the cannula of the camera in place, the wrist was placed on the hyperextension board  The scope was inserted distally, and a cotton-tip applicator was used proximally to clean the tract as well as the scope  A curved cutting knife was introduced from proximal to distal while keeping visualization with the use of the camera  Without twisting of the canula, the knife was used to cut the transverse carpal ligament completely, making sure there were no remnant fibers  Then after this was accomplished, the hand was removed from the extension block    Three maneuvers were used to confirm the full release of the transverse carpal ligament  First, the ease of twisting the trocar of the camera confirmed the release of the ligament  Second, the curved Cevallos was introduced to make sure there were no remnant fibers that could be felt palmarly  Third, the scope was introduced again to visualize that the whole ligament was released proximally to distally  Additional confirmation of full release included retraction and inspection in the distal and proximal incisions to make sure there were no remnant fibers distally or proximally respectively  At the completion of the procedure, hemostasis was obtained with cautery and direct pressure  The wounds were copiously irrigated with sterile solution  The wounds were closed with Prolene  Sterile dressings were applied, including Xeroform, gauze, tweeners, webril, ACE  Please note, all sponge, needle, and instrument counts were correct prior to closure  Loupe magnification was utilized  The patient tolerated the procedure well       I was present for the entire procedure, A qualified resident physician was not available and A physician assistant was required during the procedure for retraction tissue handling,dissection and suturing    Patient Disposition:  PACU  and hemodynamically stable    SIGNATURE: Edmond Brasher MD  DATE: 10/08/19  TIME: 11:13 AM

## 2019-10-08 NOTE — H&P
H&P Exam - Orthopedics   Edilberto Friedman 30 y o  male MRN: [id number]  Unit/Bed#: [facility] 03    Assessment/Plan   Assessment:  L CTS  Plan:  L ECTR today    History of Present Illness   HPI:  Edilberto Friedman is a 30 y o  male who presents with continued c/o L hand n/t to the thumb, index and long finger x 1 year  Minimal relief with nocturnal splinting  Describes nocturnal symptoms and pain  Historical Information  Review Of Systems:   · Skin: Normal  · Neuro: See HPI  · Musculoskeletal: See HPI  · 14 point review of systems negative except as stated above     Past Medical History:   History reviewed  No pertinent past medical history      Past Surgical History:   Past Surgical History:   Procedure Laterality Date    APPENDECTOMY      [place] WRIST Colie Risen LIG Right 8/20/2019    Procedure: RELEASE CARPAL TUNNEL ENDOSCOPIC;  Surgeon: Corinne Zafar MD;  Location: [facility];  Service: Orthopedics       Family History:  Family history reviewed and non-contributory  Family History   Problem Relation Age of Onset    Diabetes Mother     Diabetes Father     No Known Problems Brother     AVM Brother        Social History:  Social History     Socioeconomic History    Marital status: Single     Spouse name: None    Number of children: None    Years of education: None    Highest education level: None   Occupational History    None   Social Needs    Financial resource strain: None    Food insecurity:     Worry: None     Inability: None    Transportation needs:     Medical: None     Non-medical: None   Tobacco Use    Smoking status: Never Smoker    Smokeless tobacco: Never Used   Substance and Sexual Activity    Alcohol use: Yes     Frequency: Monthly or less     Drinks per session: 1 or 2     Binge frequency: Never    Drug use: Not Currently    Sexual activity: Yes   Lifestyle    Physical activity:     Days per week: None     Minutes per session: None    Stress: None   Relationships    Social connections:     Talks on phone: None     Gets together: None     Attends Adventist service: None     Active member of club or organization: None     Attends meetings of clubs or organizations: None     Relationship status: None    Intimate partner violence:     Fear of current or ex partner: None     Emotionally abused: None     Physically abused: None     Forced sexual activity: None   Other Topics Concern    None   Social History Narrative    Works as  at [employer]    Never , has 8 yo son       Allergies:   No Known Allergies        Labs:  No results found for: HCT, HGB, PT, INR, WBC, ESR, CRP    Meds:    Current Facility-Administered Medications:     lidocaine-epinephrine (XYLOCAINE/EPINEPHRINE) 1 %-1:100,000 20 mL, sodium bicarbonate 2 mEq infiltration, , Infiltration, Once, Andra Thompson MD    Blood Culture:   No results found for: BLOODCX    Wound Culture:   No results found for: WOUNDCULT    Ins and Outs:  No intake/output data recorded  Physical Exam  /73   Pulse 66   Temp 98 5 °F (36 9 °C) (Tympanic)   Resp 18   Ht 5' 9" (1 753 m)   Wt 109 kg (240 lb)   SpO2 97%   BMI 35 44 kg/m²   /73   Pulse 66   Temp 98 5 °F (36 9 °C) (Tympanic)   Resp 18   Ht 5' 9" (1 753 m)   Wt 109 kg (240 lb)   SpO2 97%   BMI 35 44 kg/m²   Gen: Alert and oriented to person, place, time  HEENT: EOMI, eyes clear, moist mucus membranes, hearing intact  Respiratory: Bilateral chest rise   No audible wheezing found  Cardiovascular: Regular Rate and Rhythm  Abdomen: soft nontender/nondistended  Ortho Exam: Well-maintained ROM of L wrist  Neuro Exam: L carpal tunnel with + Tinel's, + compression test, no APB weakness    Lab Results: Reviewed  Imaging: Reviewed

## 2019-10-08 NOTE — ANESTHESIA PREPROCEDURE EVALUATION
Review of Systems/Medical History  Patient summary reviewed  Chart reviewed  No history of anesthetic complications     Cardiovascular  Negative cardio ROS Exercise tolerance (METS): >4,     Pulmonary  Negative pulmonary ROS        GI/Hepatic  Negative GI/hepatic ROS          Negative  ROS        Endo/Other    Obesity    GYN  Negative gynecology ROS          Hematology  Negative hematology ROS      Musculoskeletal  Negative musculoskeletal ROS        Neurology  Negative neurology ROS      Psychology   Negative psychology ROS              Physical Exam    Airway    Mallampati score: I  TM Distance: >3 FB  Neck ROM: full     Dental   No notable dental hx     Cardiovascular  Comment: Negative ROS, Rhythm: regular, Rate: normal, Cardiovascular exam normal    Pulmonary  Pulmonary exam normal Breath sounds clear to auscultation,     Other Findings        Anesthesia Plan  ASA Score- 2     Anesthesia Type- IV sedation with anesthesia with ASA Monitors  Additional Monitors:   Airway Plan:         Plan Factors- Patient instructed to abstain from smoking on day of procedure  Patient did not smoke on day of surgery  Induction- intravenous  Postoperative Plan- Plan for postoperative opioid use  Informed Consent- Anesthetic plan and risks discussed with patient  I personally reviewed this patient with the CRNA  Discussed and agreed on the Anesthesia Plan with the CRNA  Hector Tobar

## 2019-10-18 ENCOUNTER — OFFICE VISIT (OUTPATIENT)
Dept: OBGYN CLINIC | Facility: HOSPITAL | Age: 29
End: 2019-10-18

## 2019-10-18 VITALS
SYSTOLIC BLOOD PRESSURE: 127 MMHG | BODY MASS INDEX: 36.14 KG/M2 | HEIGHT: 69 IN | DIASTOLIC BLOOD PRESSURE: 79 MMHG | HEART RATE: 91 BPM | WEIGHT: 244 LBS

## 2019-10-18 DIAGNOSIS — Z98.890 S/P ENDOSCOPIC CARPAL TUNNEL RELEASE: Primary | ICD-10-CM

## 2019-10-18 PROCEDURE — 99024 POSTOP FOLLOW-UP VISIT: CPT | Performed by: ORTHOPAEDIC SURGERY

## 2019-10-18 NOTE — LETTER
October 18, 2019     Patient: Anabella Yost   YOB: 1990   Date of Visit: 10/18/2019       To Whom it May Concern:    Anabella Yost is under my professional care  He was seen in my office on 10/18/2019  He may return to work on 11/19/19  If you have any questions or concerns, please don't hesitate to call           Sincerely,          Nolberto Capps MD        CC: No Recipients

## 2019-10-18 NOTE — LETTER
October 18, 2019     Patient: Jayden Llanos   YOB: 1990   Date of Visit: 10/18/2019       To Whom it May Concern:    Jayden Llanos is under my professional care  He was seen in my office on 10/18/2019  He may return to school on 10/21/19       If you have any questions or concerns, please don't hesitate to call           Sincerely,          Juan Noland MD        CC: No Recipients

## 2019-10-18 NOTE — PROGRESS NOTES
Assessment:   Carpal Tunnel Syndrome  left    Plan:   He will have some residual pillar pain  Anything 0-5 on the pain    Follow Up:  PRN      CHIEF COMPLAINT:  Chief Complaint   Patient presents with    Left Wrist - Post-op         SUBJECTIVE:  Rodney Mcdaniels is a 30 y o  male who presents for follow up after Endoscopic Carpal Tunnel Release  Left, 10/8/19  He states that he removed his sutures two days ago  Today patient has No Complaints  He is happy with his results  PHYSICAL EXAMINATION:  Vital signs: /79   Pulse 91   Ht 5' 9" (1 753 m)   Wt 111 kg (244 lb)   BMI 36 03 kg/m²   General: well developed and well nourished, alert, oriented times 3 and appears comfortable  Psychiatric: Normal    MUSCULOSKELETAL EXAMINATION:  Incision: clean, dry and palmar incision is slightly open  Range of Motion: As expected, opposition intact and full composite fist possible  Neurovascular status: Neuro intact, good cap refill and radial pulse 2+  Activity Restrictions: No restrictions  Done today: Suture were out prior to today's visit  No steris applied        STUDIES REVIEWED:  No Studies to review      PROCEDURES PERFORMED:  Procedures  No Procedures performed today     Scribe Attestation    I,:   Ajith Anaya am acting as a scribe while in the presence of the attending physician :        I,:   Cornell Lemons MD personally performed the services described in this documentation    as scribed in my presence :

## 2019-11-20 ENCOUNTER — OFFICE VISIT (OUTPATIENT)
Dept: OBGYN CLINIC | Facility: HOSPITAL | Age: 29
End: 2019-11-20
Payer: OTHER MISCELLANEOUS

## 2019-11-20 VITALS
DIASTOLIC BLOOD PRESSURE: 74 MMHG | HEART RATE: 81 BPM | BODY MASS INDEX: 36.91 KG/M2 | WEIGHT: 249.2 LBS | SYSTOLIC BLOOD PRESSURE: 120 MMHG | HEIGHT: 69 IN

## 2019-11-20 DIAGNOSIS — M19.031 CMC DJD(CARPOMETACARPAL DEGENERATIVE JOINT DISEASE), LOCALIZED PRIMARY, RIGHT: Primary | ICD-10-CM

## 2019-11-20 PROCEDURE — 20600 DRAIN/INJ JOINT/BURSA W/O US: CPT | Performed by: ORTHOPAEDIC SURGERY

## 2019-11-20 PROCEDURE — 99213 OFFICE O/P EST LOW 20 MIN: CPT | Performed by: ORTHOPAEDIC SURGERY

## 2019-11-20 RX ORDER — LIDOCAINE HYDROCHLORIDE 10 MG/ML
0.5 INJECTION, SOLUTION INFILTRATION; PERINEURAL
Status: COMPLETED | OUTPATIENT
Start: 2019-11-20 | End: 2019-11-20

## 2019-11-20 RX ORDER — BETAMETHASONE SODIUM PHOSPHATE AND BETAMETHASONE ACETATE 3; 3 MG/ML; MG/ML
3 INJECTION, SUSPENSION INTRA-ARTICULAR; INTRALESIONAL; INTRAMUSCULAR; SOFT TISSUE
Status: COMPLETED | OUTPATIENT
Start: 2019-11-20 | End: 2019-11-20

## 2019-11-20 RX ADMIN — BETAMETHASONE SODIUM PHOSPHATE AND BETAMETHASONE ACETATE 3 MG: 3; 3 INJECTION, SUSPENSION INTRA-ARTICULAR; INTRALESIONAL; INTRAMUSCULAR; SOFT TISSUE at 09:14

## 2019-11-20 RX ADMIN — LIDOCAINE HYDROCHLORIDE 0.5 ML: 10 INJECTION, SOLUTION INFILTRATION; PERINEURAL at 09:14

## 2019-11-20 NOTE — PROGRESS NOTES
ASSESSMENT/PLAN:    Assessment:   R thumb CMC DJD/synovitis    Plan:   Patient elects steroid injection today to relieve pain  We do want to limit these 2/2 his age  Comfort cool as needed     Follow Up:  3  month(s)    General Discussions:  CMC Arthritis: The anatomy and physiology of carpometacarpal joint arthritis was discussed with the patient today in the office  Deterioration of the articular cartilage eventually leads to hypermobility at the thumb [facility] joint, resulting in joint subluxation, osteophyte formation, cystic changes within the trapezium and base of the first metacarpal, as well as subchondral sclerosis  Eventually, pain, limited mobility, and compensatory hyperextension at the metacarpophalangeal joint may develop  While normal activity and usage of the thumb joint may provide a painful experience to the patient, this typically does not result in damage to the thumb or hand  Treatment options include resting thumb spica splints to decreased joint edema, pain, and inflammation  Therapy exercises to strengthen the thenar musculature may relieve pain, but do not alter the overall continued development of osteoarthritis  Oral medications, topical medications, corticosteroid injections may decrease pain and increase overall function  Eventually, approximately 5% of patients may require surgical intervention  _____________________________________________________  CHIEF COMPLAINT:  Chief Complaint   Patient presents with    Right Wrist - Post-op         SUBJECTIVE:  Bindu Kearney is a 30 y o  male who presents for follow up regarding R thumb pain  Patient has had previous ECTRs and states these have healed nicely and he no longer has n/t  However, he continues to have R thumb pain and swelling, worse with activity  PAST MEDICAL HISTORY:  History reviewed  No pertinent past medical history      PAST SURGICAL HISTORY:  Past Surgical History:   Procedure Laterality Date    APPENDECTOMY  [place] WRIST Alanna Dent LIG Right 8/20/2019    Procedure: RELEASE CARPAL TUNNEL ENDOSCOPIC;  Surgeon: Mulugeta Gonsalez MD;  [facility];  Service: Orthopedics    [place] WRIST Alanna Dent LIG Left 10/8/2019    Procedure: RELEASE CARPAL TUNNEL ENDOSCOPIC;  Surgeon: Mulugeta Gonsalez MD;  [facility];  Service: Orthopedics       FAMILY HISTORY:  Family History   Problem Relation Age of Onset    Diabetes Mother     Diabetes Father     No Known Problems Brother     AVM Brother        SOCIAL HISTORY:  Social History     Tobacco Use    Smoking status: Never Smoker    Smokeless tobacco: Never Used   Substance Use Topics    Alcohol use: Yes     Frequency: Monthly or less     Drinks per session: 1 or 2     Binge frequency: Never    Drug use: Not Currently       MEDICATIONS:    Current Outpatient Medications:     Multiple Vitamin (MULTIVITAMIN) capsule, Take 1 capsule by mouth daily, Disp: , Rfl:     Omega-3 Fatty Acids (FISH OIL) 1,000 mg, Take 1,000 mg by mouth daily, Disp: , Rfl:     HYDROcodone-acetaminophen (NORCO) 5-325 mg per tablet, Take 1 tablet by mouth every 6 (six) hours as needed for pain for up to 5 dosesMax Daily Amount: 4 tablets, Disp: 5 tablet, Rfl: 0    naproxen sodium (ALEVE) 220 MG tablet, Take 1 tablet (220 mg total) by mouth 2 (two) times a day with meals for 5 days (Patient not taking: Reported on 10/18/2019), Disp: 10 tablet, Rfl: 0    ALLERGIES:  No Known Allergies    REVIEW OF SYSTEMS:  Pertinent items are noted in HPI  A comprehensive review of systems was negative      LABS:  HgA1c: No results found for: HGBA1C  BMP:   Lab Results   Component Value Date    CALCIUM 9 8 08/27/2019    K 4 2 08/27/2019    CO2 28 08/27/2019     08/27/2019    BUN 14 08/27/2019    CREATININE 1 08 08/27/2019           _____________________________________________________  PHYSICAL EXAMINATION:  Vital signs: /74   Pulse 81   Ht 5' 9" (1 753 m)   Wt 113 kg (249 lb 3 2 oz)   BMI 36 80 kg/m²   General: well developed and well nourished, alert, oriented times 3 and appears comfortable  Psychiatric: Normal  HEENT: Trachea Midline, No torticollis  Cardiovascular: No discernable arrhythmia  Pulmonary: No wheezing or stridor  Skin: No masses, erythema, lacerations, fluctation, ulcerations  Neurovascular: Sensation Intact to the Median, Ulnar, Radial Nerve, Motor Intact to the Median, Ulnar, Radial Nerve and Pulses Intact    MUSCULOSKELETAL EXAMINATION:  RIGHT SIDE:  CMC: No Instability, Positive tendnerness CMC and negative circumduction  Pt without ttp along 1st dorsal extensor compartment  No triggering along A1 pulley   No instability of MP joint     _____________________________________________________  STUDIES REVIEWED:  No Studies to review      PROCEDURES PERFORMED:  Small joint arthrocentesis: R thumb CMC  Date/Time: 11/20/2019 9:14 AM  Consent given by: patient  Supporting Documentation  Indications: pain   Procedure Details  Location: thumb - R thumb CMC  Needle size: 25 G  Ultrasound guidance: no  Approach: volar  Medications administered: 0 5 mL lidocaine 1 %; 3 mg betamethasone acetate-betamethasone sodium phosphate 6 (3-3) mg/mL    Patient tolerance: patient tolerated the procedure well with no immediate complications  Dressing:  Sterile dressing applied            Scribe Attestation    I,:   Veronica Pizano PA-C am acting as a scribe while in the presence of the attending physician :        I,:   Andra Thompson MD personally performed the services described in this documentation    as scribed in my presence :

## 2019-11-20 NOTE — PATIENT INSTRUCTIONS
What is it [facility] Arthritis? In a normal joint, cartilage covers the end of the bones and serves as a shock absorber to allow smooth, pain-free movement  In osteoarthritis (OA, or degenerative arthritis) the cartilage layer wears out, resulting in direct contact between the bones and producing pain and deformity  One of the most common joints to develop OA in the hand is the base of the thumb  The thumb basal joint, also called the carpometacarpal McCurtain) joint, is a specialized saddle shaped joint that is formed by a small bone of the wrist (trapezium) and the first bone of the thumb (metacarpal)  The saddle shaped joint allows the thumb to have a wide range of motions, including up, down, across the palm, and the ability to pinch (see Figure 1)  Who gets it? OA at the base of the thumb is more commonly seen in women over the age of 36  The exact cause is unknown, but genetics, previous injuries such as fractures or dislocations, and generalized joint laxity may predispose towards development of this type of arthritis  What are the symptoms and signs? The most common symptom is pain at the base of the thumb  The pain can be aggravated by activities that require pinching, such as opening jars, turning door knobs or keys, and writing  Severity can also progress to pain at rest and pain at night  In more severe cases, progressive destruction and mal-alignment of the joint occurs, and a bump develops at the base of the thumb as the metacarpal moves out of the saddle joint  This shift in the joint can cause limited motion and weakness, making pinch difficult (see Figure 2)  The next joint above the [facility] may compensate by loosening, causing it to bend further back (hyperextension)  How is the diagnosis made? The diagnosis is made by history and physical evaluation  Pressure and movement such as twisting will produce pain at the joint  A grinding sensation may also be present at the joint (see Figure 3)  X-rays are used to confirm the diagnosis, although symptom severity often does not correlate with x-ray findings  What are the treatment options? Less severe thumb arthritis will usually respond to non-surgical care  Arthritis medication, splinting and limited cortisone injections may help alleviate pain  A hand therapist might provide a variety of rigid and non-rigid splints which can be used while sleeping or during activities  Patients with advanced disease or who fail non-surgical treatment may be candidates for surgical reconstruction  A variety of surgical techniques are available that can successfully reduce or eliminate pain  Surgical procedures include removal of arthritic bone and joint reconstruction (arthroplasty), joint fusion, bone realignment, and even arthroscopy in select cases  A consultation with your hand surgeon can help decide the best option for you (see Figure 4)  © 2012 American [employer] the [employer]  www handcare  org

## 2019-11-27 ENCOUNTER — TELEPHONE (OUTPATIENT)
Dept: OBGYN CLINIC | Facility: HOSPITAL | Age: 29
End: 2019-11-27

## 2019-12-10 ENCOUNTER — CONSULT (OUTPATIENT)
Dept: UROLOGY | Facility: AMBULATORY SURGERY CENTER | Age: 29
End: 2019-12-10
Payer: COMMERCIAL

## 2019-12-10 VITALS
SYSTOLIC BLOOD PRESSURE: 120 MMHG | WEIGHT: 243 LBS | HEART RATE: 85 BPM | BODY MASS INDEX: 35.99 KG/M2 | DIASTOLIC BLOOD PRESSURE: 62 MMHG | HEIGHT: 69 IN

## 2019-12-10 DIAGNOSIS — R39.198 DIFFICULTY URINATING: Primary | ICD-10-CM

## 2019-12-10 LAB
POST-VOID RESIDUAL VOLUME, ML POC: 39 ML
SL AMB  POCT GLUCOSE, UA: NORMAL
SL AMB LEUKOCYTE ESTERASE,UA: NORMAL
SL AMB POCT BILIRUBIN,UA: NORMAL
SL AMB POCT BLOOD,UA: NORMAL
SL AMB POCT CLARITY,UA: NORMAL
SL AMB POCT COLOR,UA: NORMAL
SL AMB POCT KETONES,UA: NORMAL
SL AMB POCT NITRITE,UA: NORMAL
SL AMB POCT PH,UA: 8
SL AMB POCT SPECIFIC GRAVITY,UA: 1.01
SL AMB POCT URINE PROTEIN: NORMAL
SL AMB POCT UROBILINOGEN: 0.2

## 2019-12-10 PROCEDURE — 51798 US URINE CAPACITY MEASURE: CPT | Performed by: UROLOGY

## 2019-12-10 PROCEDURE — 81002 URINALYSIS NONAUTO W/O SCOPE: CPT | Performed by: UROLOGY

## 2019-12-10 PROCEDURE — 99244 OFF/OP CNSLTJ NEW/EST MOD 40: CPT | Performed by: UROLOGY

## 2019-12-10 NOTE — PROGRESS NOTES
12/10/2019    Devaughn Angeles  1990  [id number]        Assessment  Voiding dysfunction     Plan  We discussed his symptoms  Typically in his age these are other infectious or traumatic in etiology  He does not have any history of this  Will need further evaluation with ultrasound of the urinary tract initially  I would then recommend cystoscopy to evaluate his lower urinary tract more completely as well under direct vision  He understands and agrees with this plan  I reassured him that malignancy is unlikely in his case  History of Present Illness  Sully Dickey is a 30 y o  male complaining of severe urinary hesitancy  He says for the last several years he has had worsening symptom of prolonged voiding time, and subsequent feeling of urinary urgency and frequency  He reports that the stream is quite weak  He sees other people voiding normally and feels that he has a significant problem  Interestingly healing has nocturia 1 time per night  He denies any prior infection, sexual infection or otherwise  He denies history of trauma to the area  There is no family history that is pertinent  Interestingly, he does not have problem with sexual function or ejaculation  Bladder scan 39 mL postvoid residual         AUA SYMPTOM SCORE      Most Recent Value   AUA SYMPTOM SCORE   How often have you had a sensation of not emptying your bladder completely after you finished urinating? 4   How often have you had to urinate again less than two hours after you finished urinating? 4   How often have you found you stopped and started again several times when you urinate? 4   How often have you found it difficult to postpone urination? 3   How often have you had a weak urinary stream?  4   How often have you had to push or strain to begin urination? 4   How many times did you most typically get up to urinate from the time you went to bed at night until the time you got up in the morning?   1   Quality of Life: If you were to spend the rest of your life with your urinary condition just the way it is now, how would you feel about that?  3   AUA SYMPTOM SCORE  24          Review of Systems  Review of Systems   Constitutional: Negative  HENT: Negative  Respiratory: Negative  Cardiovascular: Negative  Gastrointestinal: Negative  Genitourinary:        As per HPI   Musculoskeletal: Negative  Skin: Negative  Neurological: Negative  Hematological: Negative  Past Medical History  History reviewed  No pertinent past medical history      Past Social History  Past Surgical History:   Procedure Laterality Date    APPENDECTOMY      [place] WRIST [place] LIG Right 8/20/2019    Procedure: RELEASE CARPAL TUNNEL ENDOSCOPIC;  Surgeon: Woody Torres MD;  [facility];  Service: Orthopedics    [place] WRIST Hayes Malinon LIG Left 10/8/2019    Procedure: RELEASE CARPAL TUNNEL ENDOSCOPIC;  Surgeon: Woody Torres MD;  [facility];  Service: Orthopedics       Past Family History  Family History   Problem Relation Age of Onset    Diabetes Mother     Diabetes Father     No Known Problems Brother     AVM Brother        Past Social history  Social History     Socioeconomic History    Marital status: Single     Spouse name: Not on file    Number of children: Not on file    Years of education: Not on file    Highest education level: Not on file   Occupational History    Not on file   Social Needs    Financial resource strain: Not on file    Food insecurity:     Worry: Not on file     Inability: Not on file    Transportation needs:     Medical: Not on file     Non-medical: Not on file   Tobacco Use    Smoking status: Never Smoker    Smokeless tobacco: Never Used   Substance and Sexual Activity    Alcohol use: Yes     Frequency: Monthly or less     Drinks per session: 1 or 2     Binge frequency: Never    Drug use: Not Currently    Sexual activity: Yes Lifestyle    Physical activity:     Days per week: Not on file     Minutes per session: Not on file    Stress: Not on file   Relationships    Social connections:     Talks on phone: Not on file     Gets together: Not on file     Attends Orthodox service: Not on file     Active member of club or organization: Not on file     Attends meetings of clubs or organizations: Not on file     Relationship status: Not on file    Intimate partner violence:     Fear of current or ex partner: Not on file     Emotionally abused: Not on file     Physically abused: Not on file     Forced sexual activity: Not on file   Other Topics Concern    Not on file   Social History Narrative    Works as  at [employer]    Never , has 10 yo son     Social History     Tobacco Use   Smoking Status Never Smoker   Smokeless Tobacco Never Used       Current Medications  Current Outpatient Medications   Medication Sig Dispense Refill    HYDROcodone-acetaminophen (NORCO) 5-325 mg per tablet Take 1 tablet by mouth every 6 (six) hours as needed for pain for up to 5 dosesMax Daily Amount: 4 tablets (Patient not taking: Reported on 12/10/2019) 5 tablet 0    Multiple Vitamin (MULTIVITAMIN) capsule Take 1 capsule by mouth daily      naproxen sodium (ALEVE) 220 MG tablet Take 1 tablet (220 mg total) by mouth 2 (two) times a day with meals for 5 days (Patient not taking: Reported on 10/18/2019) 10 tablet 0    Omega-3 Fatty Acids (FISH OIL) 1,000 mg Take 1,000 mg by mouth daily       No current facility-administered medications for this visit  Allergies  No Known Allergies    Past Medical History, Social History, Family History, medications and allergies were reviewed      Vitals  Vitals:    12/10/19 0843   BP: 120/62   BP Location: Left arm   Patient Position: Sitting   Cuff Size: Adult   Pulse: 85   Weight: 110 kg (243 lb)   Height: 5' 9" (1 753 m)       Physical Exam  Physical Exam   Constitutional: He is oriented to person, place, and time  He appears well-developed and well-nourished  Cardiovascular: Normal rate  Pulmonary/Chest: Effort normal    Abdominal: Soft  Genitourinary:   Genitourinary Comments: Normal testes, uncircumcised  Musculoskeletal: Normal range of motion  Neurological: He is alert and oriented to person, place, and time  Skin: Skin is warm, dry and intact  Psychiatric: He has a normal mood and affect  Vitals reviewed           Results  No results found for: PSA  Lab Results   Component Value Date    CALCIUM 9 8 08/27/2019    K 4 2 08/27/2019    CO2 28 08/27/2019     08/27/2019    BUN 14 08/27/2019    CREATININE 1 08 08/27/2019     No results found for: WBC, HGB, HCT, MCV, PLT

## 2019-12-18 ENCOUNTER — HOSPITAL ENCOUNTER (OUTPATIENT)
Dept: ULTRASOUND IMAGING | Facility: HOSPITAL | Age: 29
Discharge: HOME | End: 2019-12-18
Attending: UROLOGY
Payer: COMMERCIAL

## 2019-12-18 DIAGNOSIS — R39.198 DIFFICULTY URINATING: ICD-10-CM

## 2019-12-18 PROCEDURE — 51798 US URINE CAPACITY MEASURE: CPT

## 2020-02-03 ENCOUNTER — PROCEDURE VISIT (OUTPATIENT)
Dept: UROLOGY | Facility: AMBULATORY SURGERY CENTER | Age: 30
End: 2020-02-03
Payer: COMMERCIAL

## 2020-02-03 VITALS
BODY MASS INDEX: 35.99 KG/M2 | HEART RATE: 69 BPM | DIASTOLIC BLOOD PRESSURE: 82 MMHG | WEIGHT: 243 LBS | HEIGHT: 69 IN | SYSTOLIC BLOOD PRESSURE: 118 MMHG

## 2020-02-03 DIAGNOSIS — R39.198 DIFFICULTY URINATING: Primary | ICD-10-CM

## 2020-02-03 LAB
SL AMB  POCT GLUCOSE, UA: NORMAL
SL AMB LEUKOCYTE ESTERASE,UA: NORMAL
SL AMB POCT BILIRUBIN,UA: NORMAL
SL AMB POCT BLOOD,UA: NORMAL
SL AMB POCT CLARITY,UA: CLEAR
SL AMB POCT COLOR,UA: YELLOW
SL AMB POCT KETONES,UA: NORMAL
SL AMB POCT NITRITE,UA: NORMAL
SL AMB POCT PH,UA: 7.5
SL AMB POCT SPECIFIC GRAVITY,UA: 1.01
SL AMB POCT URINE PROTEIN: NORMAL
SL AMB POCT UROBILINOGEN: 0.2

## 2020-02-03 PROCEDURE — 81002 URINALYSIS NONAUTO W/O SCOPE: CPT | Performed by: UROLOGY

## 2020-02-03 PROCEDURE — 52000 CYSTOURETHROSCOPY: CPT | Performed by: UROLOGY

## 2020-02-03 NOTE — LETTER
February 3, 2020     [place] , [facility]  [id number]    Patient: Lisa Fierro   YOB: 1990   Date of Visit: 2/3/2020       Dear Dr Daryl Gonzalez: Thank you for referring Lisa Fierro to me for evaluation  Below are my notes for this consultation  If you have questions, please do not hesitate to call me  I look forward to following your patient along with you  Sincerely,        Edwin Frederick MD        CC: No Recipients  Edwin Frederick MD  2/3/2020  8:55 AM  Sign at close encounter  Patient returns today in follow-up for evaluation of weak urinary stream   An ultrasound is performed that revealed normal kidneys with minimal postvoid residual   He complains of weakened stream and prolonged voiding  He denies any history of trauma  He says that he drinks water, minimal caffeine and does not smoke or drink alcohol  Cystoscopy  Date/Time: 2/3/2020 8:53 AM  Performed by: Edwin Frederick MD  Authorized by: Edwin Frederick MD     Additional Procedure Details: Flexible cystoscopy note     The patient was prepped and draped in sterile fashion  2% lidocaine jelly was instilled per urethra   The 16 [place] flexible cystoscope was placed per urethra  There is no evidence of urethral stricture or other abnormality  Prostate is normal for age, open, without outlet obstruction  Evaluation of the bladder reveals bilateral normal ureteral orifices   There is no evidence of urothelial carcinoma or suspicious lesion   The scope was removed and the procedure was terminated  The patient tolerated the procedure well  Plan  Patient was reassured that there is no evidence of bladder outlet obstruction or urethral abnormality  He has no anatomic abnormality of the urinary tract    He was reassured that drinking sufficient amounts of water and holding his voiding for at least 2 to 3 hours rather than voiding more frequently should produce a sufficient urinary stream   No treatment is indicated at this time  Recommend follow-up in the next few months for re-evaluation for noninvasive testing with uroflow and postvoid residual to ensure normal voiding

## 2020-02-03 NOTE — PROGRESS NOTES
Patient returns today in follow-up for evaluation of weak urinary stream   An ultrasound is performed that revealed normal kidneys with minimal postvoid residual   He complains of weakened stream and prolonged voiding  He denies any history of trauma  He says that he drinks water, minimal caffeine and does not smoke or drink alcohol  Cystoscopy  Date/Time: 2/3/2020 8:53 AM  Performed by: Alise Garcia MD  Authorized by: Alise Garcia MD     Additional Procedure Details: Flexible cystoscopy note     The patient was prepped and draped in sterile fashion  2% lidocaine jelly was instilled per urethra   The 16 [place] flexible cystoscope was placed per urethra  There is no evidence of urethral stricture or other abnormality  Prostate is normal for age, open, without outlet obstruction  Evaluation of the bladder reveals bilateral normal ureteral orifices   There is no evidence of urothelial carcinoma or suspicious lesion   The scope was removed and the procedure was terminated  The patient tolerated the procedure well  Plan  Patient was reassured that there is no evidence of bladder outlet obstruction or urethral abnormality  He has no anatomic abnormality of the urinary tract  He was reassured that drinking sufficient amounts of water and holding his voiding for at least 2 to 3 hours rather than voiding more frequently should produce a sufficient urinary stream   No treatment is indicated at this time  Recommend follow-up in the next few months for re-evaluation for noninvasive testing with uroflow and postvoid residual to ensure normal voiding

## 2020-02-21 ENCOUNTER — OFFICE VISIT (OUTPATIENT)
Dept: OBGYN CLINIC | Facility: HOSPITAL | Age: 30
End: 2020-02-21
Payer: OTHER MISCELLANEOUS

## 2020-02-21 ENCOUNTER — HOSPITAL ENCOUNTER (OUTPATIENT)
Dept: RADIOLOGY | Facility: HOSPITAL | Age: 30
Discharge: HOME | End: 2020-02-21
Attending: ORTHOPAEDIC SURGERY
Payer: COMMERCIAL

## 2020-02-21 VITALS
SYSTOLIC BLOOD PRESSURE: 129 MMHG | BODY MASS INDEX: 36.29 KG/M2 | DIASTOLIC BLOOD PRESSURE: 71 MMHG | HEIGHT: 69 IN | WEIGHT: 245 LBS | HEART RATE: 75 BPM

## 2020-02-21 DIAGNOSIS — M19.031 CMC DJD(CARPOMETACARPAL DEGENERATIVE JOINT DISEASE), LOCALIZED PRIMARY, RIGHT: ICD-10-CM

## 2020-02-21 DIAGNOSIS — M79.641 PAIN OF RIGHT HAND: Primary | ICD-10-CM

## 2020-02-21 DIAGNOSIS — M79.641 PAIN OF RIGHT HAND: ICD-10-CM

## 2020-02-21 PROCEDURE — 1036F TOBACCO NON-USER: CPT | Performed by: ORTHOPAEDIC SURGERY

## 2020-02-21 PROCEDURE — 99213 OFFICE O/P EST LOW 20 MIN: CPT | Performed by: ORTHOPAEDIC SURGERY

## 2020-02-21 PROCEDURE — 73130 X-RAY EXAM OF HAND: CPT

## 2020-02-21 PROCEDURE — 3008F BODY MASS INDEX DOCD: CPT | Performed by: ORTHOPAEDIC SURGERY

## 2020-02-21 NOTE — PROGRESS NOTES
ASSESSMENT/PLAN:    Assessment:   Thumb CMC Arthritis  right    Plan:   Patient will have a MRI of right hand to evaluate for internal derangement    Follow Up: After Testing    To Do Next Visit:  Discuss MRI    General Discussions:  [facility] Arthritis: The anatomy and physiology of carpometacarpal joint arthritis was discussed with the patient today in the office  Deterioration of the articular cartilage eventually leads to hypermobility at the thumb [facility] joint, resulting in joint subluxation, osteophyte formation, cystic changes within the trapezium and base of the first metacarpal, as well as subchondral sclerosis  Eventually, pain, limited mobility, and compensatory hyperextension at the metacarpophalangeal joint may develop  While normal activity and usage of the thumb joint may provide a painful experience to the patient, this typically does not result in damage to the thumb or hand  Treatment options include resting thumb spica splints to decreased joint edema, pain, and inflammation  Therapy exercises to strengthen the thenar musculature may relieve pain, but do not alter the overall continued development of osteoarthritis  Oral medications, topical medications, corticosteroid injections may decrease pain and increase overall function  Eventually, approximately 5% of patients may require surgical intervention  _____________________________________________________  CHIEF COMPLAINT:  Chief Complaint   Patient presents with    Right Thumb - Follow-up         SUBJECTIVE:  Dago Maciel is a 30y o  year old male who presents for follow up regarding Thumb CMC Arthritis  right  Since last visit, Dago Maciel has tried steroid injections without relief  Today there is Pain  Moderate  Intermittant  Dull and Aching to the right thumb  He denies any clicking, catching, or popping  Radiation: None      PAST MEDICAL HISTORY:  History reviewed  No pertinent past medical history      PAST SURGICAL HISTORY:  Past Surgical History:   Procedure Laterality Date    APPENDECTOMY      [place] WRIST Adamaris Hubbardk LIG Right 8/20/2019    Procedure: RELEASE CARPAL TUNNEL ENDOSCOPIC;  Surgeon: Jorge Bay MD;  [facility];  Service: Orthopedics    [place] WRIST Adamaris Reek LIG Left 10/8/2019    Procedure: RELEASE CARPAL TUNNEL ENDOSCOPIC;  Surgeon: Jorge Bay MD;  [facility];  Service: Orthopedics       FAMILY HISTORY:  Family History   Problem Relation Age of Onset    Diabetes Mother     Diabetes Father     No Known Problems Brother     AVM Brother        SOCIAL HISTORY:  Social History     Tobacco Use    Smoking status: Never Smoker    Smokeless tobacco: Never Used   Substance Use Topics    Alcohol use: Yes     Frequency: Monthly or less     Drinks per session: 1 or 2     Binge frequency: Never    Drug use: Not Currently       MEDICATIONS:    Current Outpatient Medications:     HYDROcodone-acetaminophen (NORCO) 5-325 mg per tablet, Take 1 tablet by mouth every 6 (six) hours as needed for pain for up to 5 dosesMax Daily Amount: 4 tablets (Patient not taking: Reported on 12/10/2019), Disp: 5 tablet, Rfl: 0    Multiple Vitamin (MULTIVITAMIN) capsule, Take 1 capsule by mouth daily, Disp: , Rfl:     naproxen sodium (ALEVE) 220 MG tablet, Take 1 tablet (220 mg total) by mouth 2 (two) times a day with meals for 5 days (Patient not taking: Reported on 10/18/2019), Disp: 10 tablet, Rfl: 0    Omega-3 Fatty Acids (FISH OIL) 1,000 mg, Take 1,000 mg by mouth daily, Disp: , Rfl:     ALLERGIES:  No Known Allergies    REVIEW OF SYSTEMS:  Pertinent items are noted in HPI  A comprehensive review of systems was negative      LABS:  HgA1c: No results found for: HGBA1C  BMP:   Lab Results   Component Value Date    CALCIUM 9 8 08/27/2019    K 4 2 08/27/2019    CO2 28 08/27/2019     08/27/2019    BUN 14 08/27/2019    CREATININE 1 08 08/27/2019 _____________________________________________________    PHYSICAL EXAMINATION:  /71   Pulse 75   Ht 5' 9" (1 753 m)   Wt 111 kg (245 lb)   BMI 36 18 kg/m²   General: well developed and well nourished, alert, oriented times 3 and appears comfortable  Psychiatric: Normal  HEENT: Trachea Midline, No torticollis  Cardiovascular: No discernable arrhythmia  Pulmonary: No wheezing or stridor  Skin: No masses, erythema, lacerations, fluctation, ulcerations  Neurovascular: Sensation Intact to the Median, Ulnar, Radial Nerve, Motor Intact to the Median, Ulnar, Radial Nerve and Pulses Intact    MUSCULOSKELETAL EXAMINATION:  RIGHT SIDE:  CMC: No Instability, Positive tendnerness CMC and negative circumduction  Pt without ttp along 1st dorsal extensor compartment  No triggering along A1 pulley  No instability of MP joint  Positive TTP over the A1 pulley  _____________________________________________________  STUDIES REVIEWED:  Images were reviewd in PACS: right hand x-rays no fracture or dislocation, mild CMC OA        PROCEDURES PERFORMED:  Procedures  No Procedures performed today      Scribe Attestation    I,:    am acting as a scribe while in the presence of the attending physician :        I,:    personally performed the services described in this documentation    as scribed in my presence :

## 2020-02-24 ENCOUNTER — TELEPHONE (OUTPATIENT)
Dept: OBGYN CLINIC | Facility: HOSPITAL | Age: 30
End: 2020-02-24

## 2020-02-24 NOTE — TELEPHONE ENCOUNTER
Patient of [employer] called asking for an updated work status letter to be faxed to:    Fax: [phone]: Claim ID [address]    : Domo Polo   P# [phone]

## 2020-02-27 ENCOUNTER — HOSPITAL ENCOUNTER (OUTPATIENT)
Dept: MRI IMAGING | Facility: HOSPITAL | Age: 30
Discharge: HOME | End: 2020-02-27
Payer: OTHER MISCELLANEOUS

## 2020-02-27 DIAGNOSIS — M79.641 PAIN OF RIGHT HAND: ICD-10-CM

## 2020-02-27 DIAGNOSIS — M19.031 CMC DJD(CARPOMETACARPAL DEGENERATIVE JOINT DISEASE), LOCALIZED PRIMARY, RIGHT: ICD-10-CM

## 2020-02-27 PROCEDURE — 73218 MRI UPPER EXTREMITY W/O DYE: CPT

## 2020-04-20 ENCOUNTER — TELEPHONE (OUTPATIENT)
Dept: UROLOGY | Facility: AMBULATORY SURGERY CENTER | Age: 30
End: 2020-04-20

## 2020-04-21 ENCOUNTER — TELEPHONE (OUTPATIENT)
Dept: FAMILY MEDICINE CLINIC | Facility: CLINIC | Age: 30
End: 2020-04-21

## 2020-06-10 ENCOUNTER — OFFICE VISIT (OUTPATIENT)
Dept: OBGYN CLINIC | Facility: HOSPITAL | Age: 30
End: 2020-06-10
Payer: OTHER MISCELLANEOUS

## 2020-06-10 VITALS
HEART RATE: 84 BPM | DIASTOLIC BLOOD PRESSURE: 58 MMHG | BODY MASS INDEX: 35.28 KG/M2 | HEIGHT: 69 IN | SYSTOLIC BLOOD PRESSURE: 117 MMHG | WEIGHT: 238.2 LBS

## 2020-06-10 DIAGNOSIS — M65.9 SYNOVITIS OF HAND: Primary | ICD-10-CM

## 2020-06-10 PROCEDURE — 1036F TOBACCO NON-USER: CPT | Performed by: ORTHOPAEDIC SURGERY

## 2020-06-10 PROCEDURE — 3008F BODY MASS INDEX DOCD: CPT | Performed by: ORTHOPAEDIC SURGERY

## 2020-06-10 PROCEDURE — 99213 OFFICE O/P EST LOW 20 MIN: CPT | Performed by: ORTHOPAEDIC SURGERY

## 2021-04-16 ENCOUNTER — OFFICE VISIT (OUTPATIENT)
Dept: FAMILY MEDICINE CLINIC | Facility: CLINIC | Age: 31
End: 2021-04-16
Payer: COMMERCIAL

## 2021-04-16 VITALS
RESPIRATION RATE: 18 BRPM | HEIGHT: 72 IN | WEIGHT: 245 LBS | HEART RATE: 84 BPM | DIASTOLIC BLOOD PRESSURE: 70 MMHG | SYSTOLIC BLOOD PRESSURE: 120 MMHG | OXYGEN SATURATION: 97 % | TEMPERATURE: 98 F | BODY MASS INDEX: 33.18 KG/M2

## 2021-04-16 DIAGNOSIS — Z13.220 LIPID SCREENING: ICD-10-CM

## 2021-04-16 DIAGNOSIS — Z13.29 THYROID DISORDER SCREEN: ICD-10-CM

## 2021-04-16 DIAGNOSIS — Z13.1 DIABETES MELLITUS SCREENING: ICD-10-CM

## 2021-04-16 DIAGNOSIS — Z00.00 ANNUAL PHYSICAL EXAM: Primary | ICD-10-CM

## 2021-04-16 DIAGNOSIS — Z11.4 SCREENING FOR HIV (HUMAN IMMUNODEFICIENCY VIRUS): ICD-10-CM

## 2021-04-16 DIAGNOSIS — R06.00 DOE (DYSPNEA ON EXERTION): ICD-10-CM

## 2021-04-16 DIAGNOSIS — E78.5 DYSLIPIDEMIA: ICD-10-CM

## 2021-04-16 DIAGNOSIS — R53.83 FATIGUE, UNSPECIFIED TYPE: ICD-10-CM

## 2021-04-16 DIAGNOSIS — Z86.16 PERSONAL HISTORY OF COVID-19: ICD-10-CM

## 2021-04-16 PROCEDURE — 99395 PREV VISIT EST AGE 18-39: CPT | Performed by: FAMILY MEDICINE

## 2021-04-16 PROCEDURE — 3725F SCREEN DEPRESSION PERFORMED: CPT | Performed by: FAMILY MEDICINE

## 2021-04-16 NOTE — PROGRESS NOTES
[facility]    NAME: Tom Yancey  AGE: 26 y o   SEX: male  : 1990     DATE: 2021     Assessment and Plan:     Problem List Items Addressed This Visit        Other    Dyslipidemia      Other Visit Diagnoses     Annual physical exam    -  Primary    Personal history of COVID-19        2021 at [facility]    Relevant Orders    NT-BNP PRO    C-reactive protein (CRP)    DEXTER Screen w/ Reflex to Titer/Pattern    CK (with reflex to MB)    XR chest pa & lateral    Echo complete with contrast if indicated    D-dimer, quantitative    TSH, 3rd generation with Free T4 reflex    Comprehensive metabolic panel    CBC and differential    Ambulatory referral to Physical Therapy    Ambulatory referral to Occupational Therapy    ECG 12 lead    Diabetes mellitus screening        Thyroid disorder screen        Lipid screening        Relevant Orders    Lipid panel    Screening for HIV (human immunodeficiency virus)        Relevant Orders    HIV 1/2 Antigen/Antibody (4th Generation) w Reflex [facility]    BMI 33 0-33 9,adult        TURCIOS (dyspnea on exertion)        Relevant Orders    NT-BNP PRO    C-reactive protein (CRP)    DEXTER Screen w/ Reflex to Titer/Pattern    CK (with reflex to MB)    XR chest pa & lateral    Echo complete with contrast if indicated    D-dimer, quantitative    TSH, 3rd generation with Free T4 reflex    Comprehensive metabolic panel    CBC and differential    Ambulatory referral to Physical Therapy    Ambulatory referral to Occupational Therapy    ECG 12 lead    Fatigue, unspecified type        Relevant Orders    NT-BNP PRO    C-reactive protein (CRP)    DEXTER Screen w/ Reflex to Titer/Pattern    CK (with reflex to MB)    XR chest pa & lateral    Echo complete with contrast if indicated    D-dimer, quantitative    TSH, 3rd generation with Free T4 reflex    Comprehensive metabolic panel    CBC and differential    Ambulatory referral to Physical Therapy    Ambulatory referral to Occupational Therapy    ECG 12 lead          Immunizations and preventive care screenings were discussed with patient today  Appropriate education was printed on patient's after visit summary  Counseling:  · Exercise: the importance of regular exercise/physical activity was discussed  Recommend exercise 3-5 times per week for at least 30 minutes  Return for f/u after testing obtained  Chief Complaint:     Chief Complaint   Patient presents with    Physical Exam     Would like to have blood work completed       History of Present Illness:     Adult Annual Physical   Patient here for a comprehensive physical exam  The patient reports as below  C/o "Had the Sergioport in January and still not all normal- fatigue easily, still lose my breath, so want to make sure all my levels are ok"  States got testing done at [facility] was positive 01/11/2021      Diet and Physical Activity  · Diet/Nutrition: poor diet  · Exercise: 1-2 times a week on average  Depression Screening  PHQ-9 Depression Screening    PHQ-9:   Frequency of the following problems over the past two weeks:      Little interest or pleasure in doing things: 0 - not at all  Feeling down, depressed, or hopeless: 0 - not at all  PHQ-2 Score: 0       General Health  · Sleep: gets 7-8 hours of sleep on average  · Hearing: normal - bilateral   · Vision: no vision problems and most recent eye exam >1 year ago  · Dental: regular dental visits   Health  · History of STDs?: no      Review of Systems:     Review of Systems   All other systems reviewed and are negative  Past Medical History:     History reviewed  No pertinent past medical history     Past Surgical History:     Past Surgical History:   Procedure Laterality Date    APPENDECTOMY      [place] WRIST [place] LIG Right 8/20/2019    Procedure: RELEASE CARPAL TUNNEL ENDOSCOPIC;  Surgeon: Daya Concepcion MD;  Location: [facility] OR;  Service: Orthopedics    [place] WRIST [place] LIG Left 10/8/2019    Procedure: RELEASE CARPAL TUNNEL ENDOSCOPIC;  Surgeon: Cookie Marks MD;  Location[facility];  Service: Orthopedics      Social History:        Social History     Socioeconomic History    Marital status: Single     Spouse name: None    Number of children: None    Years of education: None    Highest education level: None   Occupational History    None   Social Needs    Financial resource strain: None    Food insecurity     Worry: None     Inability: None    Transportation needs     Medical: No     Non-medical: No   Tobacco Use    Smoking status: Never Smoker    Smokeless tobacco: Never Used   Substance and Sexual Activity    Alcohol use: Yes     Frequency: Monthly or less     Drinks per session: 1 or 2     Binge frequency: Never     Comment: occasionally     Drug use: Not Currently    Sexual activity: Yes     Partners: Female   Lifestyle    Physical activity     Days per week: None     Minutes per session: None    Stress: None   Relationships    Social connections     Talks on phone: None     Gets together: None     Attends Congregation service: None     Active member of club or organization: None     Attends meetings of clubs or organizations: None     Relationship status: None    Intimate partner violence     Fear of current or ex partner: None     Emotionally abused: None     Physically abused: None     Forced sexual activity: None   Other Topics Concern    None   Social History Narrative    Works as  at [employer]    Never , has 10 yo son      Family History:     Family History   Problem Relation Age of Onset    Diabetes Mother     Diabetes Father     No Known Problems Brother     AVM Brother       Current Medications:     Current Outpatient Medications   Medication Sig Dispense Refill    Ascorbic Acid (vitamin C) 100 MG tablet Take 100 mg by mouth daily      ibuprofen (MOTRIN) 400 mg tablet 1 Tab PO once a day for 14 days, then PRN pain 100 tablet 1    multivitamin (THERAGRAN) TABS Take 1 tablet by mouth daily       No current facility-administered medications for this visit  Allergies:     No Known Allergies   Physical Exam:     /70 (BP Location: Left arm, Patient Position: Sitting)   Pulse 84   Temp 98 °F (36 7 °C)   Resp 18   Ht 6' (1 829 m)   Wt 111 kg (245 lb)   SpO2 97%   BMI 33 23 kg/m²     Physical Exam  Vitals signs and nursing note reviewed  Constitutional:       General: He is not in acute distress  Appearance: Normal appearance  He is well-developed  He is not ill-appearing, toxic-appearing or diaphoretic  HENT:      Head: Normocephalic and atraumatic  Right Ear: Hearing, tympanic membrane, ear canal and external ear normal       Left Ear: Hearing, tympanic membrane, ear canal and external ear normal    Eyes:      General: Lids are normal       Conjunctiva/sclera: Conjunctivae normal       Pupils: Pupils are equal, round, and reactive to light  Neck:      Musculoskeletal: Neck supple  Thyroid: No thyroid mass or thyromegaly  Vascular: No JVD  Trachea: Trachea normal    Cardiovascular:      Rate and Rhythm: Normal rate and regular rhythm  Pulses: Normal pulses  Heart sounds: Normal heart sounds  Pulmonary:      Effort: Pulmonary effort is normal       Breath sounds: Normal breath sounds  Abdominal:      General: Bowel sounds are normal  There is no distension or abdominal bruit  Palpations: Abdomen is soft  There is no hepatomegaly, splenomegaly or mass  Tenderness: There is no abdominal tenderness  Hernia: No hernia is present  Musculoskeletal:      Right knee: Normal       Left knee: Normal       Cervical back: Normal       Thoracic back: Normal       Lumbar back: Normal       Right lower leg: No edema  Left lower leg: No edema        Right foot: Normal       Left foot: Normal  Lymphadenopathy:      Cervical: No cervical adenopathy  Upper Body:      Right upper body: No supraclavicular adenopathy  Left upper body: No supraclavicular adenopathy  Skin:     General: Skin is warm and dry  Coloration: Skin is not pale  Neurological:      Mental Status: He is alert and oriented to person, place, and time  Sensory: No sensory deficit  Motor: No tremor, atrophy or abnormal muscle tone  Coordination: Coordination normal       Gait: Gait normal       Deep Tendon Reflexes: Reflexes are normal and symmetric  Psychiatric:         Mood and Affect: Mood normal          Behavior: Behavior normal  Behavior is cooperative  Dana Seaman DO   [address]  BMI Counseling: Body mass index is 33 23 kg/m²  The BMI is above normal  Nutrition recommendations include 3-5 servings of fruits/vegetables daily  Exercise recommendations include exercising 3-5 times per week

## 2021-04-19 ENCOUNTER — RA CDI HCC (OUTPATIENT)
Dept: OTHER | Facility: HOSPITAL | Age: 31
End: 2021-04-19

## 2021-04-19 NOTE — PROGRESS NOTES
[facility]  coding opportunities          Chart reviewed, no opportunity found: CHART REVIEWED, NO OPPORTUNITY FOUND              Patients insurance company: [employer] (Medicare Advantage and Commercial)

## 2021-04-22 LAB
ALBUMIN SERPL-MCNC: 4.7 G/DL (ref 3.6–5.1)
ALBUMIN/GLOB SERPL: 1.5 (CALC) (ref 1–2.5)
ALP SERPL-CCNC: 75 U/L (ref 36–130)
ALT SERPL-CCNC: 43 U/L (ref 9–46)
ANA SER QL IF: NEGATIVE
AST SERPL-CCNC: 23 U/L (ref 10–40)
BASOPHILS # BLD AUTO: 59 CELLS/UL (ref 0–200)
BASOPHILS NFR BLD AUTO: 0.8 %
BILIRUB SERPL-MCNC: 0.5 MG/DL (ref 0.2–1.2)
BUN SERPL-MCNC: 15 MG/DL (ref 7–25)
BUN/CREAT SERPL: NORMAL (CALC) (ref 6–22)
CALCIUM SERPL-MCNC: 10.1 MG/DL (ref 8.6–10.3)
CHLORIDE SERPL-SCNC: 102 MMOL/L (ref 98–110)
CHOLEST SERPL-MCNC: 169 MG/DL
CHOLEST/HDLC SERPL: 4.7 (CALC)
CK MB CFR SERPL ELPH: 0.7 NG/ML (ref 0–5)
CO2 SERPL-SCNC: 30 MMOL/L (ref 20–32)
CREAT SERPL-MCNC: 0.92 MG/DL (ref 0.6–1.35)
CRP SERPL-MCNC: 16.6 MG/L
D DIMER PPP FEU-MCNC: 0.29 MCG/ML FEU
EOSINOPHIL # BLD AUTO: 281 CELLS/UL (ref 15–500)
EOSINOPHIL NFR BLD AUTO: 3.8 %
ERYTHROCYTE [DISTWIDTH] IN BLOOD BY AUTOMATED COUNT: 13.1 % (ref 11–15)
GLOBULIN SER CALC-MCNC: 3.1 G/DL (CALC) (ref 1.9–3.7)
GLUCOSE SERPL-MCNC: 92 MG/DL (ref 65–99)
HCT VFR BLD AUTO: 50.2 % (ref 38.5–50)
HDLC SERPL-MCNC: 36 MG/DL
HGB BLD-MCNC: 16.5 G/DL (ref 13.2–17.1)
HIV 1+2 AB+HIV1 P24 AG SERPL QL IA: NORMAL
LDLC SERPL CALC-MCNC: 111 MG/DL (CALC)
LYMPHOCYTES # BLD AUTO: 2065 CELLS/UL (ref 850–3900)
LYMPHOCYTES NFR BLD AUTO: 27.9 %
MCH RBC QN AUTO: 29.9 PG (ref 27–33)
MCHC RBC AUTO-ENTMCNC: 32.9 G/DL (ref 32–36)
MCV RBC AUTO: 90.9 FL (ref 80–100)
MONOCYTES # BLD AUTO: 718 CELLS/UL (ref 200–950)
MONOCYTES NFR BLD AUTO: 9.7 %
NEUTROPHILS # BLD AUTO: 4277 CELLS/UL (ref 1500–7800)
NEUTROPHILS NFR BLD AUTO: 57.8 %
NONHDLC SERPL-MCNC: 133 MG/DL (CALC)
NT-PROBNP SERPL-MCNC: 15 PG/ML
PLATELET # BLD AUTO: 285 THOUSAND/UL (ref 140–400)
PMV BLD REES-ECKER: 9.9 FL (ref 7.5–12.5)
POTASSIUM SERPL-SCNC: 4.3 MMOL/L (ref 3.5–5.3)
PROT SERPL-MCNC: 7.8 G/DL (ref 6.1–8.1)
RBC # BLD AUTO: 5.52 MILLION/UL (ref 4.2–5.8)
SL AMB EGFR AFRICAN AMERICAN: 127 ML/MIN/1.73M2
SL AMB EGFR NON AFRICAN AMERICAN: 110 ML/MIN/1.73M2
SODIUM SERPL-SCNC: 139 MMOL/L (ref 135–146)
TRIGL SERPL-MCNC: 113 MG/DL
TSH SERPL-ACNC: 0.57 MIU/L (ref 0.4–4.5)
WBC # BLD AUTO: 7.4 THOUSAND/UL (ref 3.8–10.8)

## 2021-04-23 ENCOUNTER — OFFICE VISIT (OUTPATIENT)
Dept: FAMILY MEDICINE CLINIC | Facility: CLINIC | Age: 31
End: 2021-04-23
Payer: COMMERCIAL

## 2021-04-23 VITALS
SYSTOLIC BLOOD PRESSURE: 108 MMHG | HEART RATE: 80 BPM | TEMPERATURE: 97.4 F | WEIGHT: 245 LBS | OXYGEN SATURATION: 97 % | BODY MASS INDEX: 35.07 KG/M2 | DIASTOLIC BLOOD PRESSURE: 74 MMHG | HEIGHT: 70 IN

## 2021-04-23 DIAGNOSIS — R79.82 ELEVATED C-REACTIVE PROTEIN (CRP): Primary | ICD-10-CM

## 2021-04-23 DIAGNOSIS — Z86.16 PERSONAL HISTORY OF COVID-19: ICD-10-CM

## 2021-04-23 DIAGNOSIS — M25.40 JOINT SWELLING: ICD-10-CM

## 2021-04-23 PROCEDURE — 99213 OFFICE O/P EST LOW 20 MIN: CPT | Performed by: FAMILY MEDICINE

## 2021-04-23 PROCEDURE — 1036F TOBACCO NON-USER: CPT | Performed by: FAMILY MEDICINE

## 2021-04-23 PROCEDURE — 3008F BODY MASS INDEX DOCD: CPT | Performed by: FAMILY MEDICINE

## 2021-04-23 RX ORDER — DIPHENOXYLATE HYDROCHLORIDE AND ATROPINE SULFATE 2.5; .025 MG/1; MG/1
1 TABLET ORAL DAILY
COMMUNITY

## 2021-04-23 RX ORDER — RIBOFLAVIN (VITAMIN B2) 100 MG
100 TABLET ORAL DAILY
COMMUNITY

## 2021-04-23 RX ORDER — IBUPROFEN 400 MG/1
TABLET ORAL
Qty: 100 TABLET | Refills: 1 | Status: SHIPPED | OUTPATIENT
Start: 2021-04-23 | End: 2021-05-14 | Stop reason: SDUPTHER

## 2021-04-23 RX ORDER — IBUPROFEN 400 MG/1
TABLET ORAL
Qty: 100 TABLET | Refills: 1 | Status: SHIPPED | OUTPATIENT
Start: 2021-04-23 | End: 2021-04-23 | Stop reason: SDUPTHER

## 2021-04-23 NOTE — PROGRESS NOTES
Assessment/Plan:         Diagnoses and all orders for this visit:    Elevated C-reactive protein (CRP)  -     Discontinue: ibuprofen (MOTRIN) 400 mg tablet; 1 Tab PO once a day for 14 days, then PRN pain  -     ibuprofen (MOTRIN) 400 mg tablet; 1 Tab PO once a day for 14 days, then PRN pain    Personal history of COVID-19  -     Discontinue: ibuprofen (MOTRIN) 400 mg tablet; 1 Tab PO once a day for 14 days, then PRN pain  -     ibuprofen (MOTRIN) 400 mg tablet; 1 Tab PO once a day for 14 days, then PRN pain    Joint swelling  -     Discontinue: ibuprofen (MOTRIN) 400 mg tablet; 1 Tab PO once a day for 14 days, then PRN pain  -     ibuprofen (MOTRIN) 400 mg tablet; 1 Tab PO once a day for 14 days, then PRN pain    Other orders  -     Ascorbic Acid (vitamin C) 100 MG tablet; Take 100 mg by mouth daily  -     multivitamin (THERAGRAN) TABS; Take 1 tablet by mouth daily          Subjective:   Chief Complaint   Patient presents with    Follow-up     Discuss test results  No refills needed  Patient ID: Serina Yanes is a 26 y o  male  Here for scheduled f/u appt  States, "Hands do swell up when do repetitive movement- have to stop, been going on for awhile," did have b/l carpal tunnel surg 1 5 yrs ago  Going Monday to start post-covid [address] [facility],[address] [facility] clinic       The following portions of the patient's history were reviewed and updated as appropriate: allergies, current medications, past family history, past medical history, past social history, past surgical history and problem list     Review of Systems      Objective:      /74 (BP Location: Left arm, Patient Position: Sitting, Cuff Size: Large)   Pulse 80   Temp (!) 97 4 °F (36 3 °C) (Tympanic)   Ht 5' 10" (1 778 m)   Wt 111 kg (245 lb)   SpO2 97%   BMI 35 15 kg/m²          Physical Exam  Constitutional:       General: He is not in acute distress  Appearance: He is well-developed  He is not ill-appearing, toxic-appearing or diaphoretic     HENT: Head: Normocephalic and atraumatic  Eyes:      General: Lids are normal       Conjunctiva/sclera: Conjunctivae normal    Neck:      Trachea: Phonation normal    Cardiovascular:      Rate and Rhythm: Normal rate and regular rhythm  Heart sounds: Normal heart sounds  Pulmonary:      Effort: Pulmonary effort is normal       Breath sounds: Normal breath sounds  Musculoskeletal:      Right lower leg: No edema  Left lower leg: No edema  Skin:     Coloration: Skin is not pale  Neurological:      Mental Status: He is alert and oriented to person, place, and time  Psychiatric:         Mood and Affect: Mood normal          Behavior: Behavior is cooperative

## 2021-04-26 ENCOUNTER — EVALUATION (OUTPATIENT)
Dept: PHYSICAL THERAPY | Facility: REHABILITATION | Age: 31
End: 2021-04-26
Payer: COMMERCIAL

## 2021-04-26 DIAGNOSIS — Z86.16 PERSONAL HISTORY OF COVID-19: Primary | ICD-10-CM

## 2021-04-26 DIAGNOSIS — R53.83 FATIGUE, UNSPECIFIED TYPE: ICD-10-CM

## 2021-04-26 DIAGNOSIS — R06.00 DOE (DYSPNEA ON EXERTION): ICD-10-CM

## 2021-04-26 PROCEDURE — 97162 PT EVAL MOD COMPLEX 30 MIN: CPT | Performed by: PHYSICAL THERAPIST

## 2021-04-26 NOTE — PROGRESS NOTES
PT Evaluation     Name: Ciara Clarke  Date: 21  : 1990  Referring Provider: Romaine Baez DO  AUTHORIZATION:   Insurance: Payor: Gatito Hernandez / Plan: CAP[employer] BC PLAN 361 / Product Type: Blue Fee for Service /     SUBJECTIVE:  HPI: Ciara Clarke is a 26 y o  male referred to outpatient physical therapy for the following diagnosis   Encounter Diagnoses   Name Primary?  Personal history of COVID-19     TURCIOS (dyspnea on exertion)     Fatigue, unspecified type           Patient reports having COVID19 in 2021, confirmed 21  He still loses his breath and feels tired  He will have to stop when taking walks at times  Even when speaking sometimes, feels he needs to pause to catch his breath  Works in [employer], walking frequently through the day, feels comfortable walking a quarter mile and needs to stop  Feels things are about the same, hasn't changed much  Denies dizziness  Denies imbalance  Has been doing third shift for 2 years, co-worker volunteered to switch with him and is now doing second shift  Has been having trouble sleeping, but sleeps 8-9 hours  Initially had soreness about the right ankle  Comes and goes  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, work in maintenance  Takes care of boilers, air handlers  No difficulty with memory  Patient goals: getting back to normal endurance  No past medical history on file      Current Outpatient Medications:     Ascorbic Acid (vitamin C) 100 MG tablet, Take 100 mg by mouth daily, Disp: , Rfl:     ibuprofen (MOTRIN) 400 mg tablet, 1 Tab PO once a day for 14 days, then PRN pain, Disp: 100 tablet, Rfl: 1    multivitamin (THERAGRAN) TABS, Take 1 tablet by mouth daily, Disp: , Rfl:     OBJECTIVE:  /79, 67 bpm    Oculomotor & Coordination    Smooth pursuit Normal; normal saccades; normal convergence   Fingertip to nose Normal; no disdiadochokinesia      Static Balance    Romberg Normal Sharpened Romberg Normal     Mobility Measures 4/26/21   5 Time Sit to Stand  (17" chair, arms across chest) 9 1 seconds   Functional Gait Assessment (see below) 29/30   6 Minute Walk Test (100 foot circular course) 98% SpO2  77 bpm    1950 feet    Ending heart rate 91 beats/minute, 98% SpO2    /89     Functional Gait Assessment  3/3 Gait level surface  3/3 Change in gait speed  3/3 Gait with horizontal head turns  3/3 Gait with vertical head turns  3/3 Gait and pivot turn  3/3 Step over obstacle  3/3 Gait with narrow base of support  2/3 Gait with eyes closed  3/3 Ambulating backwards  3/3 Steps  29/30 Total score (less than 22/30 indicates increased risk of fall)        Strength/MMT  127 lbs right hand  (Vance position 2), 127 lbs left  5/5 lower extremity strength    SLUMS  28/30 (4/5 on recall, 1/2 on repeating numbers backwards)    Exertional testing  Progressive incline and/or speed on treadmill, continuous heart rate monitoring  3 5 mph, 2% incline, 1 min, 11/20 RPE,   4% incline to 2 min  6% incline to 3 min, 17/20 RPE, 160s bpm  8% incline to 4 min  10% incline to 5 min, 19/20 RPE, 180 bpm max  Decreased to 2 5 mph 2% grade for 2 min, heart rate to 160s and 150s bpm    ASSESSMENT:  Meet Lopez is a 26year old male with chief concern of impairment in endurance and shortness of breath following COVID19 diagnosis in January 2021  He had an appropriate heart rate response to exertion, and reported feeling normal with higher levels of exertion  Static and dynamic balance was normal, as was oculomotor function, coordination, strength and cognitive status  Recommend some deliberate aerobic and strengthening exercise to address chief concern  Mainly, walking briskly to tolerance and initiating some large muscle group strengthening exercises  Will check in by phone in 2 weeks  Further referral needed: No    SHORT-TERM GOALS: 2 month(s)  1  Patient reports return to normal walking endurance    2  Patient reports return to normal activity level without concern for fatigue  PLAN OF CARE:  Patient will benefit from physical therapy as needed for up to 2 months  Neuromuscular re-education, therapeutic exercises, and therapeutic activities as outlined in grids  Jarad Carvalho, PT  4/26/2021     Addendum:  Called patient last week, no answer    In [email or web address]

## 2021-04-28 ENCOUNTER — TELEPHONE (OUTPATIENT)
Dept: FAMILY MEDICINE CLINIC | Facility: CLINIC | Age: 31
End: 2021-04-28

## 2021-04-28 NOTE — TELEPHONE ENCOUNTER
DOS- 5/6 ECHO    DX- shortness of breathe, fatigue, Covid  CPT- 73845    Tax [id number]  NPI - [id number]

## 2021-05-03 NOTE — TELEPHONE ENCOUNTER
Spoke with Beatrice Becerra at [facility]    No prior authorization is needed for CPT 82230

## 2021-05-05 NOTE — TELEPHONE ENCOUNTER
Kym called for an update on the auth and has been notified that no [place] is needed  She said if this was put in Epic then she would not have to call for updates

## 2021-05-06 ENCOUNTER — HOSPITAL ENCOUNTER (OUTPATIENT)
Dept: NON INVASIVE DIAGNOSTICS | Facility: HOSPITAL | Age: 31
Discharge: HOME | End: 2021-05-06
Payer: COMMERCIAL

## 2021-05-06 ENCOUNTER — OFFICE VISIT (OUTPATIENT)
Dept: LAB | Facility: HOSPITAL | Age: 31
End: 2021-05-06
Payer: COMMERCIAL

## 2021-05-06 DIAGNOSIS — Z86.16 PERSONAL HISTORY OF COVID-19: ICD-10-CM

## 2021-05-06 DIAGNOSIS — R53.83 FATIGUE, UNSPECIFIED TYPE: ICD-10-CM

## 2021-05-06 DIAGNOSIS — R06.00 DOE (DYSPNEA ON EXERTION): ICD-10-CM

## 2021-05-06 PROCEDURE — 93306 TTE W/DOPPLER COMPLETE: CPT

## 2021-05-06 PROCEDURE — 93005 ELECTROCARDIOGRAM TRACING: CPT

## 2021-05-06 PROCEDURE — 93306 TTE W/DOPPLER COMPLETE: CPT | Performed by: INTERNAL MEDICINE

## 2021-05-07 LAB
ATRIAL RATE: 75 BPM
P AXIS: 20 DEGREES
PR INTERVAL: 124 MS
QRS AXIS: 16 DEGREES
QRSD INTERVAL: 88 MS
QT INTERVAL: 370 MS
QTC INTERVAL: 413 MS
T WAVE AXIS: 23 DEGREES
VENTRICULAR RATE: 75 BPM

## 2021-05-07 PROCEDURE — 93010 ELECTROCARDIOGRAM REPORT: CPT | Performed by: INTERNAL MEDICINE

## 2021-05-10 ENCOUNTER — RA CDI HCC (OUTPATIENT)
Dept: OTHER | Facility: HOSPITAL | Age: 31
End: 2021-05-10

## 2021-05-14 ENCOUNTER — OFFICE VISIT (OUTPATIENT)
Dept: FAMILY MEDICINE CLINIC | Facility: CLINIC | Age: 31
End: 2021-05-14
Payer: COMMERCIAL

## 2021-05-14 VITALS
TEMPERATURE: 97.8 F | DIASTOLIC BLOOD PRESSURE: 68 MMHG | SYSTOLIC BLOOD PRESSURE: 112 MMHG | OXYGEN SATURATION: 96 % | BODY MASS INDEX: 34.16 KG/M2 | HEART RATE: 72 BPM | WEIGHT: 244 LBS | HEIGHT: 71 IN

## 2021-05-14 DIAGNOSIS — Z86.16 PERSONAL HISTORY OF COVID-19: ICD-10-CM

## 2021-05-14 DIAGNOSIS — R79.82 ELEVATED C-REACTIVE PROTEIN (CRP): ICD-10-CM

## 2021-05-14 DIAGNOSIS — M25.40 JOINT SWELLING: ICD-10-CM

## 2021-05-14 DIAGNOSIS — E78.5 DYSLIPIDEMIA: Primary | ICD-10-CM

## 2021-05-14 PROCEDURE — 3008F BODY MASS INDEX DOCD: CPT | Performed by: FAMILY MEDICINE

## 2021-05-14 PROCEDURE — 99214 OFFICE O/P EST MOD 30 MIN: CPT | Performed by: FAMILY MEDICINE

## 2021-05-14 PROCEDURE — 1036F TOBACCO NON-USER: CPT | Performed by: FAMILY MEDICINE

## 2021-05-14 RX ORDER — IBUPROFEN 400 MG/1
TABLET ORAL
Qty: 100 TABLET | Refills: 1 | Status: SHIPPED | OUTPATIENT
Start: 2021-05-14

## 2021-05-14 RX ORDER — IBUPROFEN 400 MG/1
TABLET ORAL
Qty: 100 TABLET | Refills: 1 | Status: SHIPPED | OUTPATIENT
Start: 2021-05-14 | End: 2021-05-14 | Stop reason: SDUPTHER

## 2021-05-14 NOTE — PROGRESS NOTES
Assessment/Plan:         Diagnoses and all orders for this visit:    Dyslipidemia    Elevated C-reactive protein (CRP)  -     Discontinue: ibuprofen (MOTRIN) 400 mg tablet; 1 Tab PO once a day for 14 days, then PRN pain  -     ibuprofen (MOTRIN) 400 mg tablet; 1 Tab PO once a day for 14 days, then PRN pain    Personal history of COVID-19  -     Discontinue: ibuprofen (MOTRIN) 400 mg tablet; 1 Tab PO once a day for 14 days, then PRN pain  -     ibuprofen (MOTRIN) 400 mg tablet; 1 Tab PO once a day for 14 days, then PRN pain    Joint swelling  -     Discontinue: ibuprofen (MOTRIN) 400 mg tablet; 1 Tab PO once a day for 14 days, then PRN pain  -     ibuprofen (MOTRIN) 400 mg tablet; 1 Tab PO once a day for 14 days, then PRN pain          Subjective:   Chief Complaint   Patient presents with    Follow-up     3 week check  No concerns today  No refills needed  Patient ID: Holly Harper is a 26 y o  male  Scheduled office visit  Has not needed to use motrin- hands not bothering him so much now, "quite frankly been feeling normal now"  No interval acute problem      The following portions of the patient's history were reviewed and updated as appropriate: allergies, current medications, past family history, past medical history, past social history, past surgical history and problem list     Review of Systems   All other systems reviewed and are negative  Objective:      /68 (BP Location: Left arm, Patient Position: Sitting, Cuff Size: Standard)   Pulse 72   Temp 97 8 °F (36 6 °C) (Tympanic)   Ht 5' 10 5" (1 791 m)   Wt 111 kg (244 lb)   SpO2 96%   BMI 34 52 kg/m²          Physical Exam  Vitals signs and nursing note reviewed  Constitutional:       General: He is not in acute distress  Appearance: He is well-developed  He is not ill-appearing, toxic-appearing or diaphoretic  HENT:      Head: Normocephalic and atraumatic     Eyes:      General: Lids are normal       Conjunctiva/sclera: Conjunctivae normal       Pupils: Pupils are equal, round, and reactive to light  Neck:      Musculoskeletal: Neck supple  Thyroid: No thyroid mass or thyromegaly  Vascular: No JVD  Trachea: Trachea normal    Cardiovascular:      Rate and Rhythm: Normal rate and regular rhythm  Pulses: Normal pulses  Heart sounds: Normal heart sounds  Pulmonary:      Effort: Pulmonary effort is normal       Breath sounds: Normal breath sounds  Abdominal:      General: Bowel sounds are normal  There is no distension or abdominal bruit  Palpations: Abdomen is soft  There is no hepatomegaly, splenomegaly or mass  Tenderness: There is no abdominal tenderness  Lymphadenopathy:      Cervical: No cervical adenopathy  Upper Body:      Right upper body: No supraclavicular adenopathy  Left upper body: No supraclavicular adenopathy  Skin:     General: Skin is warm and dry  Coloration: Skin is not pale  Neurological:      Mental Status: He is alert and oriented to person, place, and time  Gait: Gait normal       Deep Tendon Reflexes: Reflexes are normal and symmetric  Psychiatric:         Mood and Affect: Mood normal          Behavior: Behavior normal  Behavior is cooperative

## 2021-11-08 ENCOUNTER — TELEPHONE (OUTPATIENT)
Dept: FAMILY MEDICINE CLINIC | Facility: CLINIC | Age: 31
End: 2021-11-08

## 2021-11-08 DIAGNOSIS — Z11.9 ENCOUNTER FOR SCREENING FOR INFECTIOUS AND PARASITIC DISEASES, UNSPECIFIED: Primary | ICD-10-CM

## 2021-11-08 PROBLEM — Z86.16 PERSONAL HISTORY OF COVID-19: Status: ACTIVE | Noted: 2021-11-08

## 2022-04-12 ENCOUNTER — RA CDI HCC (OUTPATIENT)
Dept: OTHER | Facility: HOSPITAL | Age: 32
End: 2022-04-12

## 2022-04-12 NOTE — PROGRESS NOTES
[facility]  coding opportunities       Chart reviewed, no opportunity found: CHART REVIEWED, NO OPPORTUNITY FOUND        Patients Insurance        Commercial Insurance: [address] [place] [address]

## 2022-04-30 PROBLEM — Z11.59 ENCOUNTER FOR HEPATITIS C SCREENING TEST FOR LOW RISK PATIENT: Status: ACTIVE | Noted: 2022-04-30

## 2022-09-11 NOTE — DISCHARGE INSTRUCTIONS
Post Operative Instructions    You have had surgery on your arm today, please read and follow the information below:  · Elevate your hand above your elbow during the next 24-48 hours to help with swelling  · Place your hand and arm over your head with motion at your shoulder three times a day  · Do not apply any cream/ointment/oil to your incisions including antibiotics  · Do not soak your hands in standing water (dishwater, tubs, Jacuzzi's, pools, etc ) until given permission (typically 2-3 weeks after injury)    Call the office if you notice any:  · Increased numbness or tingling of your hand or fingers that is not relieved with elevation  · Increasing pain that is not controlled with medication  · Difficulty chewing, breathing, swallowing  · Chest pains or shortness of breath  · Fever over 101 4 degrees  Bandage: Remove bandage after 5 days  Motion: Move fingers into a fist 5 times a day, DO NOT move any splinted fingers  Weight bearing status: Avoid heavy lifting (>5 pounds) with the extremity that was operated on until follow up appointment  Normal activities of daily living are OK  Ice: Ice for 10 minutes every hour as needed for swelling x 24 hours  Sling: No sling necessary  Medications:   Naproxen 220 mg two times a day   Tylenol Extended Release 650 mg every 8 hours  Norco/Hydrocodone one tab every 6 hours AS NEEDED for pain     Follow-up Appointment: 7-10 days  Please call the office if you have any questions or concerns regarding your post-operative care 
abstain from alcohol / substances

## 2023-03-08 ENCOUNTER — OFFICE VISIT (OUTPATIENT)
Dept: URGENT CARE | Age: 33
End: 2023-03-08

## 2023-03-08 VITALS
RESPIRATION RATE: 16 BRPM | OXYGEN SATURATION: 96 % | HEART RATE: 110 BPM | SYSTOLIC BLOOD PRESSURE: 142 MMHG | TEMPERATURE: 98.1 F | DIASTOLIC BLOOD PRESSURE: 69 MMHG

## 2023-03-08 DIAGNOSIS — J02.9 SORE THROAT: Primary | ICD-10-CM

## 2023-03-08 LAB — S PYO AG THROAT QL: NEGATIVE

## 2023-03-08 NOTE — PROGRESS NOTES
[facility]        NAME: Obie Merino is a 27 y o  male  : 1990    MRN: [id number]  DATE: 2023  TIME: 10:50 AM    Assessment and Plan   Sore throat [J02 9]  1  Sore throat  POCT rapid strepA    Throat culture      Rapid strep performed in office found to be negative, throat culture results pending and should be available in MyChart within 48 hours  May use Tylenol 650 mg every 4-6 hours and alternate with ibuprofen 600 mg every 6-8 hours in combination with warm salt water gargles, hot tea with honey, Chloraseptic throat spray and Cepacol lozenges as needed for sore throat  Follow-up with primary care provider if symptoms do not resolve within 1 to 2 weeks  Patient Instructions     Pharyngitis   WHAT YOU NEED TO KNOW:   Pharyngitis, or sore throat, is inflammation of the tissues and structures in your pharynx (throat)  Pharyngitis is most often caused by bacteria or a virus  Other causes include smoking, allergies, or acid reflux  DISCHARGE INSTRUCTIONS:   Call your local emergency number (911 in the [address] [place] [address],[address]) if:   • You have trouble breathing or swallowing because your throat is swollen         Return to the emergency department if:   • You are drooling because it hurts too much to swallow      • Your fever is higher than 102? F (39?C) or lasts longer than 3 days      • You are confused      • You taste blood      Call your doctor if:   • Your throat pain gets worse      • You have a painful lump in your throat that does not go away after 5 days      • Your symptoms do not improve after 5 days      • You have questions or concerns about your condition or care      Medicines:  Viral pharyngitis will go away on its own without treatment  Your sore throat should start to feel better in 3 to 5 days  You may need any of the following:  • Antibiotics  treat a bacterial infection      • NSAIDs  help decrease swelling and pain or fever  This medicine is available with or without a doctor's order  NSAIDs can cause stomach bleeding or kidney problems in certain people  If you take blood thinner medicine, always ask your healthcare provider if NSAIDs are safe for you  Always read the medicine label and follow directions      • Acetaminophen  decreases pain and fever  It is available without a doctor's order  Ask how much to take and how often to take it  Follow directions  Read the labels of all other medicines you are using to see if they also contain acetaminophen, or ask your doctor or pharmacist  Acetaminophen can cause liver damage if not taken correctly      • Take your medicine as directed  Contact your healthcare provider if you think your medicine is not helping or if you have side effects  Tell your provider if you are allergic to any medicine  Keep a list of the medicines, vitamins, and herbs you take  Include the amounts, and when and why you take them  Bring the list or the pill bottles to follow-up visits  Carry your medicine list with you in case of an emergency      Manage your symptoms:   • Gargle salt water  Mix ¼ teaspoon salt in an 8 ounce glass of warm water and gargle  Do not swallow  Salt water may help decrease swelling in your throat      • Drink liquids as directed  You may need to drink more liquids than usual  Liquids may help soothe your throat and prevent dehydration  Ask how much liquid to drink each day and which liquids are best for you      • Use a cool mist humidifier  This will add moisture to the air and help decrease your cough      • Soothe your throat  Cough drops, ice, soft foods, or popsicles may help decrease throat pain      Prevent the spread of pharyngitis:  Cover your mouth and nose when you cough or sneeze  Do not share food or drinks  Wash your hands often  Use soap and water   If soap and water are unavailable, use an alcohol-based hand          Follow up with your doctor as directed:  Write down your questions so you remember to ask them during your visits  © Copyright [facility] 2022 Information is for End User's use only and may not be sold, redistributed or otherwise used for commercial purposes  The above information is an  only  It is not intended as medical advice for individual conditions or treatments  Talk to your doctor, nurse or pharmacist before following any medical regimen to see if it is safe and effective for you             Follow up with PCP in 3-5 days  Proceed to  ER if symptoms worsen  Chief Complaint     Chief Complaint   Patient presents with   • Sore Throat     Since this morning, bilateral tonsils swollen, mildly red, headache,          History of Present Illness       Patient is a 26-year-old male with no significant past medical history who presents for evaluation of sore throat since this morning  He denies cough, congestion, fever, nausea/vomiting/diarrhea, chest pain or palpitations  Sore Throat   Pertinent negatives include no congestion, coughing, diarrhea, ear discharge, ear pain, headaches, neck pain, shortness of breath, stridor or vomiting  Review of Systems   Review of Systems   Constitutional: Negative for fatigue and fever  HENT: Positive for sore throat  Negative for congestion, ear discharge, ear pain, postnasal drip, rhinorrhea, sinus pressure, sinus pain and sneezing  Eyes: Negative  Negative for pain, discharge, redness and itching  Respiratory: Negative  Negative for apnea, cough, choking, chest tightness, shortness of breath, wheezing and stridor  Cardiovascular: Negative  Negative for chest pain and palpitations  Gastrointestinal: Negative  Negative for diarrhea, nausea and vomiting  Endocrine: Negative  Negative for polydipsia, polyphagia and polyuria  Genitourinary: Negative  Negative for decreased urine volume and flank pain  Musculoskeletal: Negative  Negative for arthralgias, back pain, myalgias, neck pain and neck stiffness  Skin: Negative  Negative for color change and rash  Allergic/Immunologic: Negative  Negative for environmental allergies  Neurological: Negative  Negative for dizziness, facial asymmetry, light-headedness, numbness and headaches  Hematological: Negative  Negative for adenopathy  Psychiatric/Behavioral: Negative  Current Medications       Current Outpatient Medications:   •  Ascorbic Acid (vitamin C) 100 MG tablet, Take 100 mg by mouth daily, Disp: , Rfl:   •  ibuprofen (MOTRIN) 400 mg tablet, 1 Tab PO once a day for 14 days, then PRN pain, Disp: 100 tablet, Rfl: 1  •  multivitamin (THERAGRAN) TABS, Take 1 tablet by mouth daily, Disp: , Rfl:     Current Allergies     Allergies as of 03/08/2023   • (No Known Allergies)            The following portions of the patient's history were reviewed and updated as appropriate: allergies, current medications, past family history, past medical history, past social history, past surgical history and problem list      No past medical history on file  Past Surgical History:   Procedure Laterality Date   • APPENDECTOMY     • NE WRIST Rachel Jarrod LIG Right 8/20/2019    Procedure: RELEASE CARPAL TUNNEL ENDOSCOPIC;  Surgeon: Yasmin Garcia MD;  Location: [facility];  Service: Orthopedics   • NE WRIST Rachel Jarrod LIG Left 10/8/2019    Procedure: RELEASE CARPAL TUNNEL ENDOSCOPIC;  Surgeon: Yasmin Garcia MD;  Location: [facility];  Service: Orthopedics       Family History   Problem Relation Age of Onset   • Diabetes Mother    • Diabetes Father    • No Known Problems Brother    • AVM Brother          Medications have been verified  Objective   /69   Pulse (!) 110   Temp 98 1 °F (36 7 °C)   Resp 16   SpO2 96%        Physical Exam     Physical Exam  Vitals reviewed  Constitutional:       General: He is not in acute distress  Appearance: Normal appearance  He is not ill-appearing, toxic-appearing or diaphoretic  Interventions: He is not intubated  HENT:      Head: Normocephalic and atraumatic  Right Ear: Tympanic membrane and ear canal normal  No drainage, swelling or tenderness  No middle ear effusion  Tympanic membrane is not erythematous  Left Ear: Tympanic membrane and ear canal normal  No drainage, swelling or tenderness  No middle ear effusion  Tympanic membrane is not erythematous  Nose: Nose normal  No congestion or rhinorrhea  Mouth/Throat:      Mouth: Mucous membranes are moist  No oral lesions  Pharynx: Uvula midline  Posterior oropharyngeal erythema present  No pharyngeal swelling, oropharyngeal exudate or uvula swelling  Tonsils: No tonsillar exudate or tonsillar abscesses  3+ on the right  3+ on the left  Eyes:      Extraocular Movements: Extraocular movements intact  Pupils: Pupils are equal, round, and reactive to light  Neck:      Thyroid: No thyromegaly  Cardiovascular:      Rate and Rhythm: Normal rate and regular rhythm  Heart sounds: Normal heart sounds, S1 normal and S2 normal  Heart sounds not distant  No murmur heard  No friction rub  No gallop  Pulmonary:      Effort: Pulmonary effort is normal  No tachypnea, bradypnea, accessory muscle usage, prolonged expiration, respiratory distress or retractions  He is not intubated  Breath sounds: Normal breath sounds  No stridor, decreased air movement or transmitted upper airway sounds  No decreased breath sounds, wheezing, rhonchi or rales  Musculoskeletal:         General: Normal range of motion  Cervical back: Normal range of motion and neck supple  No rigidity or tenderness  Lymphadenopathy:      Cervical: Cervical adenopathy present  Skin:     Capillary Refill: Capillary refill takes less than 2 seconds  Findings: Erythema present  Neurological:      General: No focal deficit present  Mental Status: He is alert     Psychiatric:         Mood and Affect: Mood normal

## 2023-03-08 NOTE — PATIENT INSTRUCTIONS
Rapid strep performed in office found to be negative, throat culture results pending and [facility]  May use Tylenol 650 mg every 4-6 hours and alternate with ibuprofen 600 mg every 6-8 hours in combination with warm salt water gargles, hot tea with honey, Chloraseptic throat spray and Cepacol lozenges as needed for sore throat  Follow-up with primary care provider if symptoms do not resolve within 1 to 2 weeks

## 2023-03-10 LAB — BACTERIA THROAT CULT: ABNORMAL

## 2024-09-07 ENCOUNTER — TELEPHONE (OUTPATIENT)
Dept: FAMILY MEDICINE CLINIC | Facility: CLINIC | Age: 34
End: 2024-09-07

## 2024-09-08 NOTE — TELEPHONE ENCOUNTER
PATIENT ATTRIBUTION      Pt was last seen here 5/14/2021     Please send take necessary actions and remove patient from my attributed roster as appropriate

## 2024-09-09 NOTE — TELEPHONE ENCOUNTER
Attribution     Attempt #1 3/18/23    Attempt #2 9/9/24 LMOM to verify if patient wants to stay established with practice or if he is seeing new PCP

## 2025-03-15 ENCOUNTER — HOSPITAL ENCOUNTER (EMERGENCY)
Facility: HOSPITAL | Age: 35
Discharge: HOME | End: 2025-03-15
Attending: EMERGENCY MEDICINE

## 2025-03-15 VITALS
RESPIRATION RATE: 16 BRPM | HEART RATE: 88 BPM | TEMPERATURE: 98.2 F | OXYGEN SATURATION: 98 % | DIASTOLIC BLOOD PRESSURE: 91 MMHG | SYSTOLIC BLOOD PRESSURE: 143 MMHG

## 2025-03-15 DIAGNOSIS — S39.011A ABDOMINAL MUSCLE STRAIN, INITIAL ENCOUNTER: Primary | ICD-10-CM

## 2025-03-15 LAB
ATRIAL RATE: 85 BPM
P AXIS: 48 DEGREES
PR INTERVAL: 142 MS
QRS AXIS: 26 DEGREES
QRSD INTERVAL: 94 MS
QT INTERVAL: 366 MS
QTC INTERVAL: 436 MS
T WAVE AXIS: -6 DEGREES
VENTRICULAR RATE: 85 BPM

## 2025-03-15 PROCEDURE — 93010 ELECTROCARDIOGRAM REPORT: CPT | Performed by: INTERNAL MEDICINE

## 2025-03-15 PROCEDURE — 99283 EMERGENCY DEPT VISIT LOW MDM: CPT

## 2025-03-15 PROCEDURE — 99284 EMERGENCY DEPT VISIT MOD MDM: CPT | Performed by: EMERGENCY MEDICINE

## 2025-03-15 PROCEDURE — 93005 ELECTROCARDIOGRAM TRACING: CPT

## 2025-03-15 RX ORDER — METHOCARBAMOL 500 MG/1
500 TABLET, FILM COATED ORAL 2 TIMES DAILY PRN
Qty: 20 TABLET | Refills: 0 | Status: SHIPPED | OUTPATIENT
Start: 2025-03-15

## 2025-03-15 RX ORDER — KETOROLAC TROMETHAMINE 30 MG/ML
15 INJECTION, SOLUTION INTRAMUSCULAR; INTRAVENOUS ONCE
Status: DISCONTINUED | OUTPATIENT
Start: 2025-03-15 | End: 2025-03-15

## 2025-03-15 RX ORDER — METHOCARBAMOL 500 MG/1
500 TABLET, FILM COATED ORAL ONCE
Status: COMPLETED | OUTPATIENT
Start: 2025-03-15 | End: 2025-03-15

## 2025-03-15 RX ORDER — IBUPROFEN 400 MG/1
400 TABLET, FILM COATED ORAL ONCE
Status: COMPLETED | OUTPATIENT
Start: 2025-03-15 | End: 2025-03-15

## 2025-03-15 RX ORDER — ACETAMINOPHEN 325 MG/1
975 TABLET ORAL ONCE
Status: COMPLETED | OUTPATIENT
Start: 2025-03-15 | End: 2025-03-15

## 2025-03-15 RX ADMIN — ACETAMINOPHEN 975 MG: 325 TABLET, FILM COATED ORAL at 19:06

## 2025-03-15 RX ADMIN — METHOCARBAMOL 500 MG: 500 TABLET ORAL at 19:06

## 2025-03-15 RX ADMIN — IBUPROFEN 400 MG: 400 TABLET, FILM COATED ORAL at 19:06

## 2025-03-15 NOTE — ED ATTENDING ATTESTATION
3/15/2025  I, Jeremy Ledesma MD, saw and evaluated the patient. I have discussed the patient with the resident/non-physician practitioner and agree with the resident's/non-physician practitioner's findings, Plan of Care, and MDM as documented in the resident's/non-physician practitioner's note, except where noted. All available labs and Radiology studies were reviewed.  I was present for key portions of any procedure(s) performed by the resident/non-physician practitioner and I was immediately available to provide assistance.       At this point I agree with the current assessment done in the Emergency Department.  I have conducted an independent evaluation of this patient a history and physical is as follows:  Patient presents for evaluation of left-sided upper abdominal or lower rib pain that has been persistent for weeks  He had been taking Ozempic but stopped this medication  He has no nausea vomiting diarrhea fever or chills he has no shortness of breath and no cough  Cardiac risk factors none no history of hypertension diabetes or elevated cholesterol non-smoker no family history of heart disease  No PE risk factors  Pain is worse if he turns his torso to the right or if he changes position like turning in bed  He has no blood in the stool he has no weight loss he has no skin rash  Exam well-appearing no acute distress HEENT exam is unremarkable neck no JVD lungs are clear heart is regular with no murmurs gallops or rubs is noted lower rib cage area on the left pain is worse to touch the area it is worse if he twists his body or leans forward  No skin rash noted abdomen is soft nontender no mass  Extremities are normal normal pulses  Impression chest wall pain  EKG first nurse shows normal sinus rhythm patient is Q waves inferiorly however there are no ST-T wave changes similar to old EKG  ED Course     Plan: Pain control    Critical Care Time  Procedures

## 2025-03-15 NOTE — DISCHARGE INSTRUCTIONS
You have been evaluated in the Emergency Department today for abdominal/rib pain. Your evaluation was not suggestive of any emergent condition requiring medical intervention at this time; however, some problems may take more time to appear. Therefore, it is important for you to watch for any new symptoms or worsening of your current condition.     Please follow up with your primary care physician or gastroenterologist as soon as possible.     Return to the Emergency Department if you experience worsening of current symptoms, severe abdominal pain, persistent fevers greater than 100.4F, recurrent vomiting, blood in vomit, blood in stool, dark tarry stool, chest pain, difficulty breathing, or any other new/concerning symptoms.

## 2025-03-25 NOTE — ED PROVIDER NOTES
Time reflects when diagnosis was documented in both MDM as applicable and the Disposition within this note       Time User Action Codes Description Comment    3/15/2025  7:01 PM Yarelis Mckenna Add [S39.011A] Abdominal muscle strain, initial encounter     3/15/2025  7:02 PM Yarelis Mckenna Modify [S39.011A] Abdominal muscle strain, initial encounter           ED Disposition       ED Disposition   Discharge    Condition   Stable    Date/Time   Sat Mar 15, 2025  7:01 PM    Comment   Archie Beverly discharge to home/self care.                   Assessment & Plan       Medical Decision Making  ASSESSMENT: Patient is a 34 y.o. male who presents with abdominal wall pain that worsens with movement.   DDX includes but not limited to: muscle strain, soft tissue injury from subcutaneous injection, doubt rib fracture.   PLAN: POC US. Symptomatic treatment. Outpatient follow up.    POC bedside US shows normal soft tissue, no cobblestoning or evidence of abscess/infection.    Discussed evaluation with findings and plan with patient. Advised on need for outpatient follow up. Given return precautions verbally and in discharge instructions, confirmed with teach back method. All questions answered prior to discharge with verbal understanding/agreement with plan expressed.    Risk  OTC drugs.  Prescription drug management.             Medications   acetaminophen (TYLENOL) tablet 975 mg (975 mg Oral Given 3/15/25 1906)   methocarbamol (ROBAXIN) tablet 500 mg (500 mg Oral Given 3/15/25 1906)   ibuprofen (MOTRIN) tablet 400 mg (400 mg Oral Given 3/15/25 1906)       ED Risk Strat Scores                            SBIRT 20yo+      Flowsheet Row Most Recent Value   Initial Alcohol Screen: US AUDIT-C     1. How often do you have a drink containing alcohol? 0 Filed at: 03/15/2025 1725   2. How many drinks containing alcohol do you have on a typical day you are drinking?  0 Filed at: 03/15/2025 1725   3a. Male UNDER 65: How often do you  have five or more drinks on one occasion? 0 Filed at: 03/15/2025 1725   3b. FEMALE Any Age, or MALE 65+: How often do you have 4 or more drinks on one occassion? 0 Filed at: 03/15/2025 1725   Audit-C Score 0 Filed at: 03/15/2025 1725   MARÍA: How many times in the past year have you...    Used an illegal drug or used a prescription medication for non-medical reasons? Never Filed at: 03/15/2025 1725                            History of Present Illness       Chief Complaint   Patient presents with    Rib Pain     Patient coming in for left lower rib pain that started a few months ago. Atraumatic. Patient feels that it may be due to his ozempic shot.        History reviewed. No pertinent past medical history.   Past Surgical History:   Procedure Laterality Date    APPENDECTOMY      MO NDSC WRST SURG W/RLS TRANSVRS CARPL LIGM Right 8/20/2019    Procedure: RELEASE CARPAL TUNNEL ENDOSCOPIC;  Surgeon: Chirag Coto MD;  [facility];  Service: Orthopedics    MO NDSC WRST SURG W/RLS TRANSVRS CARPL LIGM Left 10/8/2019    Procedure: RELEASE CARPAL TUNNEL ENDOSCOPIC;  Surgeon: Chirag Coto MD;  [facility];  Service: Orthopedics      Family History   Problem Relation Age of Onset    Diabetes Mother     Diabetes Father     No Known Problems Brother     AVM Brother       Social History     Tobacco Use    Smoking status: Never    Smokeless tobacco: Never   Vaping Use    Vaping status: Never Used   Substance Use Topics    Alcohol use: Yes     Comment: occasionally     Drug use: Not Currently      E-Cigarette/Vaping    E-Cigarette Use Never User       E-Cigarette/Vaping Substances      I have reviewed and agree with the history as documented.     HPI  Patient is a 34 y.o. male who presents to the ED for evaluation of left lower rib/abdominal pain for the past several months and worsening over the past week. Pain worsens with movement. Initially started after using injectable Ozempic. No falls or trauma.  Patient has no radiation of pain, diaphoresis, nausea/vomiting, or worsening with exertion. There is no positional or pleuritic component. No prior personal or family cardiac history. Denies fevers, chills, headache, dizziness, cough, dyspnea, chest pain, back pain, nausea, vomiting, diarrhea, dysuria, hematuria, rashes, or any other complaints or concerns at this time.    Review of Systems  All other systems reviewed and negative unless otherwise stated in HPI above.    Objective       ED Triage Vitals [03/15/25 1722]   Temperature Pulse Blood Pressure Respirations SpO2 Patient Position - Orthostatic VS   98.2 °F (36.8 °C) 88 143/91 16 98 % Sitting      Temp Source Heart Rate Source BP Location FiO2 (%) Pain Score    Oral Monitor Left arm -- 9      Vitals      Date and Time Temp Pulse SpO2 Resp BP Pain Score FACES Pain Rating User   03/15/25 1722 98.2 °F (36.8 °C) 88 98 % 16 143/91 9 -- DJS            Physical Exam  General: Awake, alert. NAD.  Head: Normocephalic, atraumatic.  Eyes: EOM-I. PERRL. No scleral icterus or conjunctival injection.  ENT: Atraumatic external nose and ears. No stridor. Normal phonation.   Neck: Symmetric, trachea midline. No JVD.   CV: No murmurs. Peripheral pulses +2 throughout. RRR.  Chest: No chest wall tenderness.  Lungs: Unlabored. No retractions. No tachypnea. Lung sounds present and CTAB.  Abd: Flat, nondistended. +BS, soft, +tender to LUQ.  MSK: FROM, no deformity/injury.  Skin: Warm, dry. No rashes.  Neuro: GCS 15. No focal deficits. Strength and sensation grossly intact.    Results Reviewed       None            No orders to display       Procedures    ED Medication and Procedure Management   Prior to Admission Medications   Prescriptions Last Dose Informant Patient Reported? Taking?   Ascorbic Acid (vitamin C) 100 MG tablet   Yes No   Sig: Take 100 mg by mouth daily   ibuprofen (MOTRIN) 400 mg tablet   No No   Si Tab PO once a day for 14 days, then PRN pain   multivitamin  (THERAGRAN) TABS   Yes No   Sig: Take 1 tablet by mouth daily      Facility-Administered Medications: None     Discharge Medication List as of 3/15/2025  7:17 PM        START taking these medications    Details   methocarbamol (ROBAXIN) 500 mg tablet Take 1 tablet (500 mg total) by mouth 2 (two) times a day as needed for muscle spasms, Starting Sat 3/15/2025, Normal           CONTINUE these medications which have NOT CHANGED    Details   Ascorbic Acid (vitamin C) 100 MG tablet Take 100 mg by mouth daily, Historical Med      ibuprofen (MOTRIN) 400 mg tablet 1 Tab PO once a day for 14 days, then PRN pain, Normal      multivitamin (THERAGRAN) TABS Take 1 tablet by mouth daily, Historical Med           No discharge procedures on file.  ED SEPSIS DOCUMENTATION   Time reflects when diagnosis was documented in both MDM as applicable and the Disposition within this note       Time User Action Codes Description Comment    3/15/2025  7:01 PM Yarelis Mckenna Add [S39.011A] Abdominal muscle strain, initial encounter     3/15/2025  7:02 PM Yarelis Mckenna Modify [S39.011A] Abdominal muscle strain, initial encounter                  Yarelis Mckenna,   03/24/25 2036

## 2025-04-05 NOTE — TELEPHONE ENCOUNTER
Darrin Kelley with [employer]   [phone]    Darrin Kelley is asking that I fax the patient's Sterling Pretty to fax [phone]  OVN is faxed 
No

## 2025-04-27 ENCOUNTER — TELEPHONE (OUTPATIENT)
Dept: FAMILY MEDICINE CLINIC | Facility: CLINIC | Age: 35
End: 2025-04-27

## 2025-04-27 NOTE — TELEPHONE ENCOUNTER
PATIENT ATTRIBUTION:     Pt was last seen here 5/14/2021      The necessary actions were taken by our staff in September 2024 by phone calls and certified letter to attempt to get pt to schedule appt, with no response from pt.      Please remove patient from our attribution panel              (Oldest Message First)September 7, 2024  Me  9/7/24 10:25 PM  Note     PATIENT ATTRIBUTION  Pt was last seen here 5/14/2021   Please take necessary actions and remove patient from my attributed roster as appropriate      9/7/24 10:25 PM  You routed this conversation to [facility]  [facility]  Keli Jean  September 9, 2024  Mary Jane Blanton   AF    9/9/24  8:08 AM  Note     Attribution   Attempt #1 3/18/23  Attempt #2 9/9/24 LMOM to verify if patient wants to stay established with practice or if he is seeing new PCP      September 10, 2024  Mary Jane Blanton  AF    9/10/24  8:12 AM  Note     Call placed to patient to schedule. LMOM   Attempt #3   Will send out certified letter.

## 2025-05-20 NOTE — PRE-PROCEDURE INSTRUCTIONS
Called pt to remind him to have labs completed prior to appt with Dr. Patel on 05/21, pt states that he will be going to lab today (05/20) to have the labs completed.    Pre-Surgery Instructions:   Medication Instructions    Omega-3 Fatty Acids (FISH OIL) 1,000 mg Instructed patient per Anesthesia Guidelines

## 2025-06-02 ENCOUNTER — HOSPITAL ENCOUNTER (EMERGENCY)
Facility: HOSPITAL | Age: 35
Discharge: HOME | End: 2025-06-02
Attending: EMERGENCY MEDICINE
Payer: COMMERCIAL

## 2025-06-02 VITALS
OXYGEN SATURATION: 96 % | DIASTOLIC BLOOD PRESSURE: 76 MMHG | RESPIRATION RATE: 18 BRPM | HEART RATE: 74 BPM | SYSTOLIC BLOOD PRESSURE: 132 MMHG | TEMPERATURE: 98.5 F

## 2025-06-02 DIAGNOSIS — G51.0 BELL'S PALSY: Primary | ICD-10-CM

## 2025-06-02 LAB
ALBUMIN SERPL BCG-MCNC: 4.9 G/DL (ref 3.5–5)
ALP SERPL-CCNC: 84 U/L (ref 34–104)
ALT SERPL W P-5'-P-CCNC: 32 U/L (ref 7–52)
ANION GAP SERPL CALCULATED.3IONS-SCNC: 8 MMOL/L (ref 4–13)
AST SERPL W P-5'-P-CCNC: 20 U/L (ref 13–39)
BASOPHILS # BLD AUTO: 0.05 THOUSANDS/ÂΜL (ref 0–0.1)
BASOPHILS NFR BLD AUTO: 1 % (ref 0–1)
BILIRUB SERPL-MCNC: 0.54 MG/DL (ref 0.2–1)
BUN SERPL-MCNC: 11 MG/DL (ref 5–25)
CALCIUM SERPL-MCNC: 9.8 MG/DL (ref 8.4–10.2)
CHLORIDE SERPL-SCNC: 102 MMOL/L (ref 96–108)
CO2 SERPL-SCNC: 29 MMOL/L (ref 21–32)
CREAT SERPL-MCNC: 1.03 MG/DL (ref 0.6–1.3)
EOSINOPHIL # BLD AUTO: 0.14 THOUSAND/ÂΜL (ref 0–0.61)
EOSINOPHIL NFR BLD AUTO: 1 % (ref 0–6)
ERYTHROCYTE [DISTWIDTH] IN BLOOD BY AUTOMATED COUNT: 13.1 % (ref 11.6–15.1)
GFR SERPL CREATININE-BSD FRML MDRD: 92 ML/MIN/1.73SQ M
GLUCOSE SERPL-MCNC: 112 MG/DL (ref 65–140)
HCT VFR BLD AUTO: 45.3 % (ref 36.5–49.3)
HGB BLD-MCNC: 15.6 G/DL (ref 12–17)
HOLD SPECIMEN: NORMAL
IMM GRANULOCYTES # BLD AUTO: 0.02 THOUSAND/UL (ref 0–0.2)
IMM GRANULOCYTES NFR BLD AUTO: 0 % (ref 0–2)
LYMPHOCYTES # BLD AUTO: 3.14 THOUSANDS/ÂΜL (ref 0.6–4.47)
LYMPHOCYTES NFR BLD AUTO: 31 % (ref 14–44)
MCH RBC QN AUTO: 30.2 PG (ref 26.8–34.3)
MCHC RBC AUTO-ENTMCNC: 34.4 G/DL (ref 31.4–37.4)
MCV RBC AUTO: 88 FL (ref 82–98)
MONOCYTES # BLD AUTO: 1.06 THOUSAND/ÂΜL (ref 0.17–1.22)
MONOCYTES NFR BLD AUTO: 10 % (ref 4–12)
NEUTROPHILS # BLD AUTO: 5.82 THOUSANDS/ÂΜL (ref 1.85–7.62)
NEUTS SEG NFR BLD AUTO: 57 % (ref 43–75)
NRBC BLD AUTO-RTO: 0 /100 WBCS
PLATELET # BLD AUTO: 307 THOUSANDS/UL (ref 149–390)
PMV BLD AUTO: 9 FL (ref 8.9–12.7)
POTASSIUM SERPL-SCNC: 3.7 MMOL/L (ref 3.5–5.3)
PROT SERPL-MCNC: 8.2 G/DL (ref 6.4–8.4)
RBC # BLD AUTO: 5.16 MILLION/UL (ref 3.88–5.62)
SODIUM SERPL-SCNC: 139 MMOL/L (ref 135–147)
WBC # BLD AUTO: 10.23 THOUSAND/UL (ref 4.31–10.16)

## 2025-06-02 PROCEDURE — 86618 LYME DISEASE ANTIBODY: CPT

## 2025-06-02 PROCEDURE — 85025 COMPLETE CBC W/AUTO DIFF WBC: CPT

## 2025-06-02 PROCEDURE — 36415 COLL VENOUS BLD VENIPUNCTURE: CPT

## 2025-06-02 PROCEDURE — 99284 EMERGENCY DEPT VISIT MOD MDM: CPT | Performed by: EMERGENCY MEDICINE

## 2025-06-02 PROCEDURE — 99283 EMERGENCY DEPT VISIT LOW MDM: CPT

## 2025-06-02 PROCEDURE — 86617 LYME DISEASE ANTIBODY: CPT

## 2025-06-02 PROCEDURE — 80053 COMPREHEN METABOLIC PANEL: CPT

## 2025-06-02 RX ORDER — PREDNISONE 20 MG/1
60 TABLET ORAL ONCE
Status: COMPLETED | OUTPATIENT
Start: 2025-06-02 | End: 2025-06-02

## 2025-06-02 RX ORDER — PREDNISONE 20 MG/1
TABLET ORAL
Qty: 24 TABLET | Refills: 0 | Status: SHIPPED | OUTPATIENT
Start: 2025-06-02 | End: 2025-06-14

## 2025-06-02 RX ADMIN — PREDNISONE 60 MG: 20 TABLET ORAL at 21:28

## 2025-06-03 ENCOUNTER — RESULTS FOLLOW-UP (OUTPATIENT)
Dept: OTHER | Facility: HOSPITAL | Age: 35
End: 2025-06-03

## 2025-06-03 LAB
B BURGDOR IGG SERPL QL IA: NEGATIVE
B BURGDOR IGG+IGM SER QL IA: POSITIVE
B BURGDOR IGM SERPL QL IA: NEGATIVE

## 2025-06-03 NOTE — ED PROVIDER NOTES
Time reflects when diagnosis was documented in both MDM as applicable and the Disposition within this note       Time User Action Codes Description Comment    6/2/2025  9:11 PM Paulette Carney Add [G51.0] Bell's palsy           ED Disposition       ED Disposition   Discharge    Condition   Stable    Date/Time   Mon Jun 2, 2025 10:59 PM    Comment   Archie Beverly discharge to home/self care.                   Assessment & Plan       Medical Decision Making  Amount and/or Complexity of Data Reviewed  Labs: ordered.    Risk  OTC drugs.  Prescription drug management.      Archie Beverly is a 35 year old male no significant past medical history presents to the ED for left-sided facial paralysis.   Came to room for stroke eval called overhead.  Considering patient's story, and physical exam stroke alert not called since this is Bell's palsy.  Patient with less wrinkling on the left side of his forehead when raising his eyebrows showing that full left side of face is affected.  Patient without any tick bites that he knows of does not have herpes.  Basic labs gathered as well as Lyme test.  Patient given dose of steroids while in the ED.  Discharged with steroid dose course.  Told to follow[facility]up with [facility] for Lyme results.    Dispo: discharge to home  Prescriptions: Steroid course    Discussed results and plan with patient. Patient was agreeable and expressed understanding. Discussed return precautions.           Medications   Artificial Tears Op Soln 1 drop (has no administration in time range)   predniSONE tablet 60 mg (60 mg Oral Given 6/2/25 2128)       ED Risk Strat Scores                    No data recorded        SBIRT 22yo+      Flowsheet Row Most Recent Value   Initial Alcohol Screen: US AUDIT-C     1. How often do you have a drink containing alcohol? 0 Filed at: 06/02/2025 2020   2. How many drinks containing alcohol do you have on a typical day you are drinking?  0 Filed at: 06/02/2025 2020   3a. Male UNDER 65:  How often do you have five or more drinks on one occasion? 0 Filed at: 06/02/2025 2020   3b. FEMALE Any Age, or MALE 65+: How often do you have 4 or more drinks on one occassion? 0 Filed at: 06/02/2025 2020   Audit-C Score 0 Filed at: 06/02/2025 2020   MARÍA: How many times in the past year have you...    Used an illegal drug or used a prescription medication for non-medical reasons? Never Filed at: 06/02/2025 2020                            History of Present Illness       Chief Complaint   Patient presents with    Facial Numbness     Pt reports left facial numbness since yesterday morning.  Reports tongue numbness, presents with left facial paralysis.  Pt presents with no other symptoms.        Past Medical History[1]   Past Surgical History[2]   Family History[3]   Social History[4]   E-Cigarette/Vaping    E-Cigarette Use Never User       E-Cigarette/Vaping Substances    Nicotine No     THC No     CBD No       I have reviewed and agree with the history as documented.     HPI    Archie Beverly is a 35 year old male no significant past medical history presents to the ED for left-sided facial paralysis.  States that he felt like it was difficult to start moving his face about 2 days ago.  Also states that he has some lost of taste to the left side of his tongue.  Stated he came to the ED today because his brother pointed out that he had a bit of a down curve to his smile while celebrating his birthday today.  Denies any headache, changes in vision, upper or lower extremity weakness, paresthesias, difficulty walking or difficulty swallowing.  Denies any exposure to ticks.  Not had this happen before.    Review of Systems   Constitutional:  Negative for chills and fever.   HENT:  Negative for congestion and rhinorrhea.    Eyes:  Negative for visual disturbance.   Respiratory:  Negative for cough and shortness of breath.    Cardiovascular:  Negative for chest pain and palpitations.   Gastrointestinal:  Negative for  abdominal pain, nausea and vomiting.   Musculoskeletal:  Negative for back pain and neck pain.   Neurological:  Positive for facial asymmetry. Negative for dizziness, weakness and headaches.           Objective       ED Triage Vitals   Temperature Pulse Blood Pressure Respirations SpO2 Patient Position - Orthostatic VS   06/02/25 2009 06/02/25 2007 06/02/25 2007 06/02/25 2007 06/02/25 2007 06/02/25 2030   98.5 °F (36.9 °C) 92 156/91 18 97 % Sitting      Temp src Heart Rate Source BP Location FiO2 (%) Pain Score    -- 06/02/25 2030 06/02/25 2030 -- --     Monitor Right arm        Vitals      Date and Time Temp Pulse SpO2 Resp BP Pain Score FACES Pain Rating User   06/02/25 2200 -- 74 96 % 18 132/76 -- -- [facility]   06/02/25 2100 -- 83 96 % 18 138/74 -- -- [facility]   06/02/25 2030 -- 79 96 % 18 145/84 -- -- [facility]   06/02/25 2009 98.5 °F (36.9 °C) -- -- -- -- -- -- [facility]   06/02/25 2007 -- 92 97 % 18 156/91 -- -- [facility]            Physical Exam  Constitutional:       General: He is not in acute distress.     Appearance: He is normal weight.   HENT:      Head: Normocephalic and atraumatic.      Mouth/Throat:      Mouth: Mucous membranes are moist.      Pharynx: Oropharynx is clear.     Eyes:      Extraocular Movements: Extraocular movements intact.      Conjunctiva/sclera: Conjunctivae normal.      Pupils: Pupils are equal, round, and reactive to light.       Cardiovascular:      Rate and Rhythm: Normal rate and regular rhythm.      Pulses: Normal pulses.      Heart sounds: Normal heart sounds.   Pulmonary:      Effort: Pulmonary effort is normal. No respiratory distress.   Abdominal:      General: There is no distension.      Palpations: Abdomen is soft.      Tenderness: There is no abdominal tenderness.     Musculoskeletal:         General: No deformity. Normal range of motion.      Cervical back: Normal range of motion. No rigidity.     Skin:     General: Skin is warm and dry.      Capillary Refill: Capillary refill takes less than 2  seconds.     Neurological:      Mental Status: He is alert and oriented to person, place, and time.      Coordination: Coordination normal.      Gait: Gait normal.      Comments: Weakness to the left side of the face, when patient attempts to lift his eyebrows, diminished on the left-hand side.  Was reactive bilaterally, no upper extremity or lower extremity weakness/numbness, good coordination on finger-to-nose test, was able to ambulate into the ED on his own   Psychiatric:         Mood and Affect: Mood normal.         Behavior: Behavior normal.         Results Reviewed       Procedure Component Value Units Date/Time    [place] draw [[id number]] Collected: 06/02/25 2013    Lab Status: In process Specimen: Blood Updated: 06/02/25 2202    Narrative:      The following orders were created for panel order [place] draw.  Procedure                               Abnormality         Status                     ---------                               -----------         ------                     Light Blue Top on hold[[id number]]                           Final result               Gold top on hold[[id number]]                                 Final result               Lavender Top 3 ml on hold[[id number]]                        Final result               Lavender Top 7ml on hold[[id number]]                                                      Please view results for these tests on the individual orders.    Comprehensive metabolic panel [[id number]] Collected: 06/02/25 2013    Lab Status: Final result Specimen: Blood from Arm, Right Updated: 06/02/25 2042     Sodium 139 mmol/L      Potassium 3.7 mmol/L      Chloride 102 mmol/L      CO2 29 mmol/L      ANION GAP 8 mmol/L      BUN 11 mg/dL      Creatinine 1.03 mg/dL      Glucose 112 mg/dL      Calcium 9.8 mg/dL      AST 20 U/L      ALT 32 U/L      Alkaline Phosphatase 84 U/L      Total Protein 8.2 g/dL      Albumin 4.9 g/dL      Total Bilirubin 0.54 mg/dL      eGFR 92 ml/min/1.73sq  m     Narrative:      [employer] guidelines for Chronic Kidney Disease (CKD):     Stage 1 with normal or high GFR (GFR > 90 mL/min/1.73 square meters)    Stage 2 Mild CKD (GFR = 60-89 mL/min/1.73 square meters)    Stage 3A Moderate CKD (GFR = 45-59 mL/min/1.73 square meters)    Stage 3B Moderate CKD (GFR = 30-44 mL/min/1.73 square meters)    Stage 4 Severe CKD (GFR = 15-29 mL/min/1.73 square meters)    Stage 5 End Stage CKD (GFR <15 mL/min/1.73 square meters)  Note: GFR calculation is accurate only with a steady state creatinine    LYME TOTAL AB W REFLEX TO IGM/IGG [[id number]] Collected: 06/02/25 2013    Lab Status: In process Specimen: Blood from Arm, Right Updated: 06/02/25 2034    Narrative:      The following orders were created for panel order LYME TOTAL AB W REFLEX TO IGM/IGG.  Procedure                               Abnormality         Status                     ---------                               -----------         ------                     Lyme Total AB W Reflex t...[[id number]]                      In process                   Please view results for these tests on the individual orders.    Lyme Total AB W Reflex to IGM/IGG [[id number]] Collected: 06/02/25 2013    Lab Status: In process Specimen: Blood from Arm, Right Updated: 06/02/25 2034    CBC and differential [[id number]]  (Abnormal) Collected: 06/02/25 2013    Lab Status: Final result Specimen: Blood from Arm, Right Updated: 06/02/25 2023     WBC 10.23 Thousand/uL      RBC 5.16 Million/uL      Hemoglobin 15.6 g/dL      Hematocrit 45.3 %      MCV 88 fL      MCH 30.2 pg      MCHC 34.4 g/dL      RDW 13.1 %      MPV 9.0 fL      Platelets 307 Thousands/uL      nRBC 0 /100 WBCs      Segmented % 57 %      Immature Grans % 0 %      Lymphocytes % 31 %      Monocytes % 10 %      Eosinophils Relative 1 %      Basophils Relative 1 %      Absolute Neutrophils 5.82 Thousands/µL      Absolute Immature Grans 0.02 Thousand/uL      Absolute  Lymphocytes 3.14 Thousands/µL      Absolute Monocytes 1.06 Thousand/µL      Eosinophils Absolute 0.14 Thousand/µL      Basophils Absolute 0.05 Thousands/µL             No orders to display       Procedures    ED Medication and Procedure Management   Prior to Admission Medications   Prescriptions Last Dose Informant Patient Reported? Taking?   Ascorbic Acid (vitamin C) 100 MG tablet   Yes No   Sig: Take 100 mg by mouth daily   ibuprofen (MOTRIN) 400 mg tablet   No No   Si Tab PO once a day for 14 days, then PRN pain   methocarbamol (ROBAXIN) 500 mg tablet   No No   Sig: Take 1 tablet (500 mg total) by mouth 2 (two) times a day as needed for muscle spasms   multivitamin (THERAGRAN) TABS   Yes No   Sig: Take 1 tablet by mouth daily      Facility-Administered Medications: None     Discharge Medication List as of 2025 10:01 PM        START taking these medications    Details   predniSONE 20 mg tablet Multiple Dosages:Starting Mon 2025, Until Thu 2025 at 2359, THEN Starting Fri 2025, Until Mon 2025 at 2359, THEN Starting Tue 6/10/2025, Until Fri 2025 at 2359Take 3 tablets (60 mg total) by mouth daily for 4 days, THEN 2 tablets  (40 mg total) daily for 4 days, THEN 1 tablet (20 mg total) daily for 4 days., Normal           CONTINUE these medications which have NOT CHANGED    Details   Ascorbic Acid (vitamin C) 100 MG tablet Take 100 mg by mouth daily, Historical Med      ibuprofen (MOTRIN) 400 mg tablet 1 Tab PO once a day for 14 days, then PRN pain, Normal      methocarbamol (ROBAXIN) 500 mg tablet Take 1 tablet (500 mg total) by mouth 2 (two) times a day as needed for muscle spasms, Starting Sat 3/15/2025, Normal      multivitamin (THERAGRAN) TABS Take 1 tablet by mouth daily, Historical Med           No discharge procedures on file.  ED SEPSIS DOCUMENTATION   Time reflects when diagnosis was documented in both MDM as applicable and the Disposition within this note       Time User Action  Codes Description Comment    6/2/2025  9:11 PM Paulette Carney Add [G51.0] Bell's palsy                    [1] No past medical history on file.  [2]   Past Surgical History:  Procedure Laterality Date    APPENDECTOMY      [facility] WRST SURG W/RLS TRANSVRS CARPL LIGM Right 8/20/2019    Procedure: RELEASE CARPAL TUNNEL ENDOSCOPIC;  Surgeon: Chirag Coto MD;  Location: [facility];  Service: Orthopedics    [facility] WRST SURG W/RLS TRANSVRS CARPL LIGM Left 10/8/2019    Procedure: RELEASE CARPAL TUNNEL ENDOSCOPIC;  Surgeon: Chirag Coto MD;  Location: [facility];  Service: Orthopedics   [3]   Family History  Problem Relation Name Age of Onset    Diabetes Mother      Diabetes Father      No Known Problems Brother      AVM Brother     [4]   Social History  Tobacco Use    Smoking status: Never    Smokeless tobacco: Never   Vaping Use    Vaping status: Never Used   Substance Use Topics    Alcohol use: Yes     Comment: occasionally     Drug use: Not Currently        Paulette Carney MD  06/02/25 [id number]

## 2025-06-03 NOTE — DISCHARGE INSTRUCTIONS
You were seen in the Emergency Department for: Difficulty moving face    Your workup today showed: Bell's palsy    Your next steps should include: Take the course of steroids as directed on the packaging, follow-up with your primary care doctor    Reasons to RETURN IMMEDIATELY to the Emergency Department: You develop eye irritation, have worsening symptoms, or new symptoms that concern you.

## 2025-06-03 NOTE — ED ATTENDING ATTESTATION
6/2/2025  I, Tomy Torres MD, saw and evaluated the patient. I have discussed the patient with the resident/non-physician practitioner and agree with the resident's/non-physician practitioner's findings, Plan of Care, and MDM as documented in the resident's/non-physician practitioner's note, except where noted. All available labs and Radiology studies were reviewed.  I was present for key portions of any procedure(s) performed by the resident/non-physician practitioner and I was immediately available to provide assistance.       At this point I agree with the current assessment done in the Emergency Department.  I have conducted an independent evaluation of this patient a history and physical is as follows:    35-year-old male unfortunately presented on his birthday with complaints of left facial abnormality which he first noticed 2 days ago.  He stated he was eating cake when his brother pointed out to him that his face looked strange and encouraged him to seek evaluation.  Patient reports that he also has diminished taste sensation on the left lateral portion of his tongue.  He denies any difficulty eating or drinking.  No visual changes.  No difficulty with speech or understanding speech.  No ataxia.  No weakness or sensory loss in extremities.    On exam he has a mild 7th nerve palsy including the forehead on the left side.  He is able to fully close the left eye.  No rashes.    Patient denies any history of HSV infection, recent tick exposure.    ED Course     Presentation is consistent with a left Bell's palsy.  Lab work for Lyme sent.  Treating with steroids and will add doxycycline if Lyme is positive.  Recommending follow-up with PCP.  Can return to the emergency department if he has any acutely worsening symptoms.    Critical Care Time  Procedures

## (undated) DEVICE — GLOVE SRG BIOGEL 7.5

## (undated) DEVICE — ADHESIVE SKN CLSR HISTOACRYL FLEX 0.5ML LF

## (undated) DEVICE — PADDING CAST 4 IN  COTTON STRL

## (undated) DEVICE — RETROGRADE KNIFE BOX OF 6: Brand: ECTRA

## (undated) DEVICE — STERILE BETHLEHEM PLASTIC HAND: Brand: CARDINAL HEALTH

## (undated) DEVICE — GLOVE INDICATOR PI UNDERGLOVE SZ 8 BLUE

## (undated) DEVICE — CHLORAPREP HI-LITE 26ML ORANGE

## (undated) DEVICE — OCCLUSIVE GAUZE STRIP,3% BISMUTH TRIBROMOPHENATE IN PETROLATUM BLEND: Brand: XEROFORM

## (undated) DEVICE — INTENDED FOR TISSUE SEPARATION, AND OTHER PROCEDURES THAT REQUIRE A SHARP SURGICAL BLADE TO PUNCTURE OR CUT.: Brand: BARD-PARKER SAFETY BLADES SIZE 15, STERILE

## (undated) DEVICE — NEEDLE 25G X 1 1/2

## (undated) DEVICE — DISPOSABLE EQUIPMENT COVER: Brand: SMALL TOWEL DRAPE

## (undated) DEVICE — ACE WRAP 4 IN UNSTERILE

## (undated) DEVICE — STRL COTTON TIP APPLCTR 6IN PK: Brand: CARDINAL HEALTH

## (undated) DEVICE — CUFF TOURNIQUET 18 X 4 IN QUICK CONNECT DISP 1 BLADDER

## (undated) DEVICE — GAUZE SPONGES,16 PLY: Brand: CURITY

## (undated) DEVICE — ACE WRAP 4 IN STERILE

## (undated) DEVICE — SPONGE PVP SCRUB WING STERILE

## (undated) DEVICE — STRETCH BANDAGE: Brand: CURITY